# Patient Record
Sex: MALE | Race: WHITE | NOT HISPANIC OR LATINO | Employment: OTHER | URBAN - METROPOLITAN AREA
[De-identification: names, ages, dates, MRNs, and addresses within clinical notes are randomized per-mention and may not be internally consistent; named-entity substitution may affect disease eponyms.]

---

## 2018-05-22 ENCOUNTER — HISTORICAL (OUTPATIENT)
Dept: RADIOLOGY | Facility: HOSPITAL | Age: 58
End: 2018-05-22

## 2018-06-29 ENCOUNTER — HISTORICAL (OUTPATIENT)
Dept: ADMINISTRATIVE | Facility: HOSPITAL | Age: 58
End: 2018-06-29

## 2018-06-29 LAB
BUN SERPL-MCNC: 12 MG/DL (ref 7–18)
CALCIUM SERPL-MCNC: 9.4 MG/DL (ref 8.5–10.1)
CHLORIDE SERPL-SCNC: 95 MMOL/L (ref 98–107)
CO2 SERPL-SCNC: 32 MMOL/L (ref 21–32)
CREAT SERPL-MCNC: 0.9 MG/DL (ref 0.6–1.3)
CREAT/UREA NIT SERPL: 13
ERYTHROCYTE [DISTWIDTH] IN BLOOD BY AUTOMATED COUNT: 12 % (ref 11.5–14.5)
GLUCOSE SERPL-MCNC: 95 MG/DL (ref 74–106)
HCT VFR BLD AUTO: 44.2 % (ref 40–51)
HGB BLD-MCNC: 15.2 GM/DL (ref 13.5–17.5)
MCH RBC QN AUTO: 32.4 PG (ref 26–34)
MCHC RBC AUTO-ENTMCNC: 34.4 GM/DL (ref 31–37)
MCV RBC AUTO: 94.2 FL (ref 80–100)
PLATELET # BLD AUTO: 201 X10(3)/MCL (ref 130–400)
PMV BLD AUTO: 11.4 FL (ref 7.4–10.4)
POTASSIUM SERPL-SCNC: 3.9 MMOL/L (ref 3.5–5.1)
RBC # BLD AUTO: 4.69 X10(6)/MCL (ref 4.5–5.9)
SODIUM SERPL-SCNC: 133 MMOL/L (ref 136–145)
WBC # SPEC AUTO: 6.3 X10(3)/MCL (ref 4.5–11)

## 2018-07-06 ENCOUNTER — HISTORICAL (OUTPATIENT)
Dept: SURGERY | Facility: HOSPITAL | Age: 58
End: 2018-07-06

## 2018-07-06 LAB
AMPHET UR QL SCN: NEGATIVE
BARBITURATE SCN PRESENT UR: NEGATIVE
BENZODIAZ UR QL SCN: POSITIVE
CANNABINOIDS UR QL SCN: POSITIVE
COCAINE UR QL SCN: NEGATIVE
OPIATES UR QL SCN: NEGATIVE
PCP UR QL: NEGATIVE
PH UR STRIP.AUTO: 7 [PH] (ref 5–8)
POTASSIUM SERPL-SCNC: 3.5 MMOL/L (ref 3.5–5.1)
TEMPERATURE, URINE (OHS): 25 DEGC (ref 20–25)

## 2018-08-17 ENCOUNTER — HISTORICAL (OUTPATIENT)
Dept: ADMINISTRATIVE | Facility: HOSPITAL | Age: 58
End: 2018-08-17

## 2018-08-17 LAB
BUN SERPL-MCNC: 8 MG/DL (ref 7–18)
CALCIUM SERPL-MCNC: 9.1 MG/DL (ref 8.5–10.1)
CHLORIDE SERPL-SCNC: 98 MMOL/L (ref 98–107)
CO2 SERPL-SCNC: 32 MMOL/L (ref 21–32)
CREAT SERPL-MCNC: 0.8 MG/DL (ref 0.6–1.3)
CREAT/UREA NIT SERPL: 10
ERYTHROCYTE [DISTWIDTH] IN BLOOD BY AUTOMATED COUNT: 11.9 % (ref 11.5–14.5)
GLUCOSE SERPL-MCNC: 83 MG/DL (ref 74–106)
HCT VFR BLD AUTO: 45.5 % (ref 40–51)
HGB BLD-MCNC: 15.5 GM/DL (ref 13.5–17.5)
MCH RBC QN AUTO: 32.3 PG (ref 26–34)
MCHC RBC AUTO-ENTMCNC: 34.1 GM/DL (ref 31–37)
MCV RBC AUTO: 94.8 FL (ref 80–100)
PLATELET # BLD AUTO: 185 X10(3)/MCL (ref 130–400)
PMV BLD AUTO: 11.7 FL (ref 7.4–10.4)
POTASSIUM SERPL-SCNC: 4.2 MMOL/L (ref 3.5–5.1)
RBC # BLD AUTO: 4.8 X10(6)/MCL (ref 4.5–5.9)
SODIUM SERPL-SCNC: 136 MMOL/L (ref 136–145)
WBC # SPEC AUTO: 7 X10(3)/MCL (ref 4.5–11)

## 2018-08-24 ENCOUNTER — HISTORICAL (OUTPATIENT)
Dept: SURGERY | Facility: HOSPITAL | Age: 58
End: 2018-08-24

## 2018-08-24 LAB
AMPHET UR QL SCN: NEGATIVE
BARBITURATE SCN PRESENT UR: NEGATIVE
BENZODIAZ UR QL SCN: POSITIVE
CANNABINOIDS UR QL SCN: POSITIVE
COCAINE UR QL SCN: NEGATIVE
OPIATES UR QL SCN: NEGATIVE
PCP UR QL: NEGATIVE
PH UR STRIP.AUTO: 5.5 [PH] (ref 5–8)
POTASSIUM SERPL-SCNC: 3.7 MMOL/L (ref 3.5–5.1)
TEMPERATURE, URINE (OHS): 25 DEGC (ref 20–25)

## 2020-09-14 ENCOUNTER — HISTORICAL (OUTPATIENT)
Dept: LAB | Facility: HOSPITAL | Age: 60
End: 2020-09-14

## 2022-04-10 ENCOUNTER — HISTORICAL (OUTPATIENT)
Dept: ADMINISTRATIVE | Facility: HOSPITAL | Age: 62
End: 2022-04-10

## 2022-04-26 VITALS
DIASTOLIC BLOOD PRESSURE: 64 MMHG | SYSTOLIC BLOOD PRESSURE: 110 MMHG | WEIGHT: 139.56 LBS | HEIGHT: 68 IN | OXYGEN SATURATION: 97 % | BODY MASS INDEX: 21.15 KG/M2

## 2022-05-03 NOTE — HISTORICAL [facility] CERNER
This is a historical note converted from Efrain. Formatting and pictures may have been removed.  Please reference Efrain for original formatting and attached multimedia. Interval H&P  Patient examined and chart reviewed.? No changes to the H&P below.? Ok to proceed with surgery.  ?   Renea Alaniz  General Surgery, PGY-1  ?  ?   Chief Complaint  hernia  History of Present Illness  57-year-old male?ready for hernia that is slowly enlarging.? He states that it will swell to the size of a tennis ball but he is also improving. ?He does say that at times interferes with his daily activities. ?He is currently between jobs.? He didnt  ?   He denies any obstructive symptoms. ?No nausea, vomiting. ?No change in bowel.  ?  Patient states he had a colonoscopy approximately 3 years ago?which was normal.  Review of Systems  Negative as in HPI  Physical Exam  ???Vitals & Measurements  ??T:?36.5? ?C (Oral)? HR:?65(Apical)? RR:?18? BP:?135/91?  ??HT:?172.72?cm? HT:?172.72?cm? WT:?64.8?kg? WT:?64.8?kg? BMI:?21.72?  Gen.: Well-developed, well-nourished, no distress  Respiratory: Unlabored  Heart: Regular rhythm and abdomen: Soft  Groin:?Large right groin inguinal hernia that is easily reducible. ?Overlying skin changes. ?Small left inguinal hernia.  Assessment/Plan  ?  57-year-old male?with large right inguinal hernia and small left femoral hernia.  ?  -To operating room for?laparoscopic?right burst bilateral internal hernia repair.  -Consult obtained in clinic  ?  Adama Landers M.D.,?PGY?3. Problem List/Past Medical History  ??Ongoing  ??Anxiety  ??Hypertension  ??Inguinal hernia  ??Morbid obesity  Procedure/Surgical History  right ear drum repair (1996).  Medications  ??Diovan  mg-25 mg oral tablet, 1 tab(s), Oral, Daily, 11 refills  ??Ninjacof oral liquid, 5 mL, Oral, q8hr, PRN,? ?Not Taking per Prescriber  ??Xanax 1 mg oral tablet, 1 mg= 1 tab(s), Oral, BID, 5 refills  Allergies  No Known Allergies  Social  History  ??Alcohol - High Risk, 10/12/2014  ???Current, Beer, 3-5 times per week, 11/15/2016  ???Current, Beer, Daily, 10/12/2014  ??Substance Abuse  ???Past, 07/03/2015  History of?cocaine use  Family History  ??Alcoholism.: Mother and Father.  ??Heart disease: Mother.  ??Lung cancer: Father.  Health Maintenance  Health Maintenance  ???Pending?(in the next year)  ??? ??OverDue  ??? ? ? ?Hypertension Maintenance-Medication Prescribed due??01/17/14??and every 1??year(s)  ??? ? ? ?Lipid Screening due??01/17/14??and every 1??year(s)  ??? ? ? ?Hypertension Management-BMP due??12/19/16??and every 1??year(s)  ??? ??Due?  ??? ? ? ?Alcohol Misuse Screening due??06/19/18??and every 1??year(s)  ??? ? ? ?Aspirin Therapy for CVD Prevention due??06/19/18??and every 1??year(s)  ??? ? ? ?Colorectal Screening due??06/19/18??Variable frequency  ??? ? ? ?Hypertension Management-Education due??06/19/18??and every 1??year(s)  ??? ? ? ?Tetanus Vaccine due??06/19/18??and every 10??year(s)  ??? ??Due In Future?  ??? ? ? ?Influenza Vaccine not due until??10/02/18??and every 1??year(s)  ??? ? ? ?Hypertension Management-Blood Pressure not due until??12/19/18??and every 6??month(s)  ??? ? ? ?Diabetes Screening not due until??12/19/18??and every 3??year(s)  ???Satisfied?(in the past 1 year)  ??? ??Satisfied?  ??? ? ? ?Blood Pressure Screening on??06/19/18.??Satisfied by Radha Guillen LPN  ??? ? ? ?Body Mass Index Check on??06/19/18.??Satisfied by Mindy JANNIEN, Radha P. P.  ??? ? ? ?Depression Screening on??06/19/18.??Satisfied by Mindy VALDERRAMAN, Radha P. P.  ??? ? ? ?Hypertension Management-Blood Pressure on??06/19/18.??Satisfied by Mindy JANNIEN, Radha P. P.  ??? ? ? ?Obesity Screening on??06/19/18.??Satisfied by Mindy JANNIEN, Radha P. P.  ??? ? ? ?Smoking Cessation on??06/19/18.??Satisfied by Mindy JANNIEN, Radha P. P.    I was present with the resident during the?examination upon rounds.  ???  [ x?] I discussed the case with the  resident and agree with the findings and plan as documented in the residents note.  [ ] I discussed the case with the resident and agree with the findings and plan as documented in the residents note except:

## 2022-05-03 NOTE — HISTORICAL [facility] C[facility]
This is a historical note converted from Efrain. Formatting and pictures may have been removed.  Please reference Efrain for original formatting and attached multimedia. The chart was reviewed and the patient was seen. Agree with assessment below. To OR today for Open Right Inguinal Hernia.  ?   Mihai James MD  Family Medicine, PGY1  ?  Visit Reason  Right?Inguinal Hernia?  ?   History of Present Illness??  Patient?is a?58?year-old?Male?who presents for follow-up of Laparoscopic repair of right femoral hernia and left inguinal hernia on 7/6/18  Seen early postop with recurring bulge in R groin, seroma vs. recurrence  Says its a little larger  Still not?having any obstructive symptoms  ?   Allergies  No Known Allergies  ???  Home Meds  acetaminophen-HYDROcodone: 1 tab(s),Oral,q6hr,PRN (as needed for pain)  alPRAzolam: See Instructions,TAKE 1 TABLET BY MOUTH 2 TIMES A DAY  azithromycin  cetirizine: 10 mg,1 tab(s),Oral,Daily  hydrochlorothiazide-olmesartan: 1 tab(s),Oral,Daily  hydrochlorothiazide-valsartan: See Instructions,TAKE 1 TABLET BY MOUTH DAILY  polymyxin B-trimethoprim ophthalmic  ?   Past Medical History  Anxiety  Hypertension  Inguinal hernia  Tobacco user  ??  Past Surgical History  right ear drum repair: 1996  lap bilateral inguinal hernia repair  ?  Family History  Father: Alcoholism.; Lung cancer  Mother: Alcoholism.; Heart disease  ??  Social History  ?Alcohol  Risk Assessment:?High Risk;?Details:?Current, Beer, 3-5 times per week  Substance Abuse  Details:?Current, Marijuana  Tobacco  Risk Assessment:?High Risk;?Details:?Current every day smoker Use:. ?Cigarettes Type:. ?4 per day.  ??  Review of Systems  Constitutional- no nausea, vomiting, fevers, or?chills  HEENT- no hearing or vision changes  CV- no chest pain, no palpitations  Pulm- no SOB or history of lung disease  GI- no abdominal pain, nausea, vomiting  - no dysuria or hematuria  MSK- no back pain, no muscle pain  Immuno- no food  allergies  Neuro- no motor or sensory losses  Psych- no history of substance abuse  ?  Physical Exam  Temperature???????????????????? ? 36.7 ???(12:58)  Systolic Blood Pressure?????? ??126 ???(12:58)  Diastolic Blood Pressure????????89 ???(12:58)  Pulse????????????????????????????????? 77 ???(12:58)  SpO2????????????????????????????????? No result  Respiratory Rate?????????????? ???20 ???(12:58)  ?  Gen- A&O, NAD  HEENT- Normocephalic, EOMI, no JVD  Pulm- CTAB, respirations are non-labored, symmetric chest wall expansion  CV- RRR, good pulses B, normal peripheral perfusion??  GI- Soft, NT, ND, normal BS, port sites healing well, L groin normal, R groin with firm bulge along inguinal canal- reducible on exam today  MSK- normal range of motion, no swelling, no deformity??  Skin- warm, dry, intact, no rash  Neuro- no focal deficits  Psych- cooperative, appropriate mood & affect  ?  Assessment/Plan  Recurrent?right inguinal vs. femoral hernia  ?  Risks, benefits, and alternatives?of?open right?inguinal hernia?repair?were?discussed with patient.  Will plan OR for 8/24/18   Above reviewed and pt seen.  He is prepared for repair of the recurrence.

## 2022-07-14 PROBLEM — I10 HYPERTENSION: Status: ACTIVE | Noted: 2022-07-14

## 2022-07-14 PROBLEM — J30.9 ALLERGIC RHINITIS: Status: ACTIVE | Noted: 2022-07-14

## 2022-07-14 PROBLEM — Z72.0 TOBACCO USER: Status: ACTIVE | Noted: 2022-07-14

## 2022-07-14 PROBLEM — G47.00 INSOMNIA: Status: ACTIVE | Noted: 2022-07-14

## 2023-09-05 PROBLEM — R56.9 SEIZURE: Status: ACTIVE | Noted: 2023-08-17

## 2023-09-05 PROBLEM — E87.1 HYPONATREMIA: Status: ACTIVE | Noted: 2023-08-17

## 2023-09-05 PROBLEM — F10.10 ALCOHOL ABUSE: Status: ACTIVE | Noted: 2023-08-17

## 2023-09-27 PROBLEM — N18.1 CHRONIC KIDNEY DISEASE, STAGE I: Status: ACTIVE | Noted: 2023-09-07

## 2023-09-27 PROBLEM — I12.9 MALIGNANT HYPERTENSIVE KIDNEY DISEASE WITH CHRONIC KIDNEY DISEASE STAGE I THROUGH STAGE IV, OR UNSPECIFIED: Status: ACTIVE | Noted: 2023-09-07

## 2023-09-28 ENCOUNTER — LAB VISIT (OUTPATIENT)
Dept: LAB | Facility: HOSPITAL | Age: 63
End: 2023-09-28
Attending: FAMILY MEDICINE
Payer: MEDICAID

## 2023-09-28 DIAGNOSIS — Z00.00 WELLNESS EXAMINATION: ICD-10-CM

## 2023-09-28 LAB
ALBUMIN SERPL-MCNC: 4.2 G/DL (ref 3.4–4.8)
ALBUMIN/GLOB SERPL: 1.7 RATIO (ref 1.1–2)
ALP SERPL-CCNC: 96 UNIT/L (ref 40–150)
ALT SERPL-CCNC: 15 UNIT/L (ref 0–55)
APPEARANCE UR: CLEAR
AST SERPL-CCNC: 15 UNIT/L (ref 5–34)
BACTERIA #/AREA URNS AUTO: NORMAL /HPF
BASOPHILS # BLD AUTO: 0.05 X10(3)/MCL
BASOPHILS NFR BLD AUTO: 0.9 %
BILIRUB SERPL-MCNC: 0.3 MG/DL
BILIRUB UR QL STRIP.AUTO: NEGATIVE
BUN SERPL-MCNC: 8.3 MG/DL (ref 8.4–25.7)
CALCIUM SERPL-MCNC: 9.7 MG/DL (ref 8.8–10)
CHLORIDE SERPL-SCNC: 92 MMOL/L (ref 98–107)
CHOLEST SERPL-MCNC: 156 MG/DL
CHOLEST/HDLC SERPL: 2 {RATIO} (ref 0–5)
CO2 SERPL-SCNC: 28 MMOL/L (ref 23–31)
COLOR UR AUTO: YELLOW
CREAT SERPL-MCNC: 0.84 MG/DL (ref 0.73–1.18)
DEPRECATED CALCIDIOL+CALCIFEROL SERPL-MC: 66.6 NG/ML (ref 30–80)
EOSINOPHIL # BLD AUTO: 0.14 X10(3)/MCL (ref 0–0.9)
EOSINOPHIL NFR BLD AUTO: 2.4 %
ERYTHROCYTE [DISTWIDTH] IN BLOOD BY AUTOMATED COUNT: 13.1 % (ref 11.5–17)
EST. AVERAGE GLUCOSE BLD GHB EST-MCNC: 96.8 MG/DL
GFR SERPLBLD CREATININE-BSD FMLA CKD-EPI: >60 MLS/MIN/1.73/M2
GLOBULIN SER-MCNC: 2.5 GM/DL (ref 2.4–3.5)
GLUCOSE SERPL-MCNC: 83 MG/DL (ref 82–115)
GLUCOSE UR QL STRIP.AUTO: NEGATIVE
HBA1C MFR BLD: 5 %
HCT VFR BLD AUTO: 43.2 % (ref 42–52)
HDLC SERPL-MCNC: 67 MG/DL (ref 35–60)
HGB BLD-MCNC: 14 G/DL (ref 14–18)
IMM GRANULOCYTES # BLD AUTO: 0.01 X10(3)/MCL (ref 0–0.04)
IMM GRANULOCYTES NFR BLD AUTO: 0.2 %
KETONES UR QL STRIP.AUTO: NEGATIVE
LDLC SERPL CALC-MCNC: 76 MG/DL (ref 50–140)
LEUKOCYTE ESTERASE UR QL STRIP.AUTO: NEGATIVE
LYMPHOCYTES # BLD AUTO: 1.1 X10(3)/MCL (ref 0.6–4.6)
LYMPHOCYTES NFR BLD AUTO: 18.9 %
MCH RBC QN AUTO: 30 PG (ref 27–31)
MCHC RBC AUTO-ENTMCNC: 32.4 G/DL (ref 33–36)
MCV RBC AUTO: 92.7 FL (ref 80–94)
MONOCYTES # BLD AUTO: 0.49 X10(3)/MCL (ref 0.1–1.3)
MONOCYTES NFR BLD AUTO: 8.4 %
NEUTROPHILS # BLD AUTO: 4.03 X10(3)/MCL (ref 2.1–9.2)
NEUTROPHILS NFR BLD AUTO: 69.2 %
NITRITE UR QL STRIP.AUTO: NEGATIVE
PH UR STRIP.AUTO: 7 [PH]
PHOSPHATE SERPL-MCNC: 3.2 MG/DL (ref 2.3–4.7)
PLATELET # BLD AUTO: 248 X10(3)/MCL (ref 130–400)
PMV BLD AUTO: 9.7 FL (ref 7.4–10.4)
POTASSIUM SERPL-SCNC: 4.4 MMOL/L (ref 3.5–5.1)
PROT SERPL-MCNC: 6.7 GM/DL (ref 5.8–7.6)
PROT UR QL STRIP.AUTO: NEGATIVE
PSA SERPL-MCNC: 2.11 NG/ML
RBC # BLD AUTO: 4.66 X10(6)/MCL (ref 4.7–6.1)
RBC #/AREA URNS AUTO: NORMAL /HPF
RBC UR QL AUTO: ABNORMAL
SODIUM SERPL-SCNC: 128 MMOL/L (ref 136–145)
SP GR UR STRIP.AUTO: 1.02 (ref 1–1.03)
SQUAMOUS #/AREA URNS AUTO: NORMAL /HPF
T3FREE SERPL-MCNC: 2.38 PG/ML (ref 1.58–3.91)
T4 FREE SERPL-MCNC: 0.91 NG/DL (ref 0.7–1.48)
TRIGL SERPL-MCNC: 67 MG/DL (ref 34–140)
TSH SERPL-ACNC: 0.4 UIU/ML (ref 0.35–4.94)
UROBILINOGEN UR STRIP-ACNC: 0.2
VIT B12 SERPL-MCNC: 542 PG/ML (ref 213–816)
VLDLC SERPL CALC-MCNC: 13 MG/DL
WBC # SPEC AUTO: 5.82 X10(3)/MCL (ref 4.5–11.5)
WBC #/AREA URNS AUTO: NORMAL /HPF

## 2023-09-28 PROCEDURE — 82607 VITAMIN B-12: CPT

## 2023-09-28 PROCEDURE — 85025 COMPLETE CBC W/AUTO DIFF WBC: CPT

## 2023-09-28 PROCEDURE — 84439 ASSAY OF FREE THYROXINE: CPT

## 2023-09-28 PROCEDURE — 84481 FREE ASSAY (FT-3): CPT

## 2023-09-28 PROCEDURE — 84100 ASSAY OF PHOSPHORUS: CPT

## 2023-09-28 PROCEDURE — 83036 HEMOGLOBIN GLYCOSYLATED A1C: CPT

## 2023-09-28 PROCEDURE — 80061 LIPID PANEL: CPT

## 2023-09-28 PROCEDURE — 82306 VITAMIN D 25 HYDROXY: CPT

## 2023-09-28 PROCEDURE — 84153 ASSAY OF PSA TOTAL: CPT

## 2023-09-28 PROCEDURE — 84443 ASSAY THYROID STIM HORMONE: CPT

## 2023-09-28 PROCEDURE — 80053 COMPREHEN METABOLIC PANEL: CPT

## 2023-09-28 PROCEDURE — 36415 COLL VENOUS BLD VENIPUNCTURE: CPT

## 2023-09-28 PROCEDURE — 81001 URINALYSIS AUTO W/SCOPE: CPT

## 2023-12-11 ENCOUNTER — HOSPITAL ENCOUNTER (EMERGENCY)
Facility: HOSPITAL | Age: 63
Discharge: HOME | End: 2023-12-11
Attending: FAMILY MEDICINE
Payer: MEDICAID

## 2023-12-11 VITALS
SYSTOLIC BLOOD PRESSURE: 174 MMHG | HEART RATE: 79 BPM | DIASTOLIC BLOOD PRESSURE: 97 MMHG | RESPIRATION RATE: 20 BRPM | TEMPERATURE: 98 F | WEIGHT: 140 LBS | BODY MASS INDEX: 21.47 KG/M2 | OXYGEN SATURATION: 100 %

## 2023-12-11 DIAGNOSIS — F19.10 POLYSUBSTANCE ABUSE: ICD-10-CM

## 2023-12-11 DIAGNOSIS — R03.0 ELEVATED BLOOD PRESSURE READING: ICD-10-CM

## 2023-12-11 DIAGNOSIS — R55 SYNCOPE, UNSPECIFIED SYNCOPE TYPE: Primary | ICD-10-CM

## 2023-12-11 DIAGNOSIS — R55 SYNCOPE: ICD-10-CM

## 2023-12-11 DIAGNOSIS — E87.1 CHRONIC HYPONATREMIA: ICD-10-CM

## 2023-12-11 LAB
AMPHET UR QL SCN: POSITIVE
ANION GAP SERPL CALC-SCNC: 12 MEQ/L
BARBITURATE SCN PRESENT UR: NEGATIVE
BASOPHILS # BLD AUTO: 0.02 X10(3)/MCL
BASOPHILS NFR BLD AUTO: 0.4 %
BENZODIAZ UR QL SCN: POSITIVE
BUN SERPL-MCNC: 5.1 MG/DL (ref 8.4–25.7)
CALCIUM SERPL-MCNC: 9.1 MG/DL (ref 8.8–10)
CANNABINOIDS UR QL SCN: POSITIVE
CHLORIDE SERPL-SCNC: 91 MMOL/L (ref 98–107)
CO2 SERPL-SCNC: 25 MMOL/L (ref 23–31)
COCAINE UR QL SCN: POSITIVE
CREAT SERPL-MCNC: 0.76 MG/DL (ref 0.73–1.18)
CREAT/UREA NIT SERPL: 7
EOSINOPHIL # BLD AUTO: 0.24 X10(3)/MCL (ref 0–0.9)
EOSINOPHIL NFR BLD AUTO: 4.9 %
ERYTHROCYTE [DISTWIDTH] IN BLOOD BY AUTOMATED COUNT: 12.4 % (ref 11.5–17)
GFR SERPLBLD CREATININE-BSD FMLA CKD-EPI: >60 MLS/MIN/1.73/M2
GLUCOSE SERPL-MCNC: 108 MG/DL (ref 82–115)
HCT VFR BLD AUTO: 39.8 % (ref 42–52)
HGB BLD-MCNC: 13.1 G/DL (ref 14–18)
IMM GRANULOCYTES # BLD AUTO: 0.01 X10(3)/MCL (ref 0–0.04)
IMM GRANULOCYTES NFR BLD AUTO: 0.2 %
LYMPHOCYTES # BLD AUTO: 0.93 X10(3)/MCL (ref 0.6–4.6)
LYMPHOCYTES NFR BLD AUTO: 19.1 %
MAGNESIUM SERPL-MCNC: 1.8 MG/DL (ref 1.6–2.6)
MCH RBC QN AUTO: 30.2 PG (ref 27–31)
MCHC RBC AUTO-ENTMCNC: 32.9 G/DL (ref 33–36)
MCV RBC AUTO: 91.7 FL (ref 80–94)
MONOCYTES # BLD AUTO: 0.41 X10(3)/MCL (ref 0.1–1.3)
MONOCYTES NFR BLD AUTO: 8.4 %
NEUTROPHILS # BLD AUTO: 3.26 X10(3)/MCL (ref 2.1–9.2)
NEUTROPHILS NFR BLD AUTO: 67 %
OPIATES UR QL SCN: NEGATIVE
PCP UR QL: NEGATIVE
PH UR: 7 [PH] (ref 3–11)
PLATELET # BLD AUTO: 192 X10(3)/MCL (ref 130–400)
PMV BLD AUTO: 10.3 FL (ref 7.4–10.4)
POC CARDIAC TROPONIN I: 0 NG/ML (ref 0–0.08)
POTASSIUM SERPL-SCNC: 3.5 MMOL/L (ref 3.5–5.1)
RBC # BLD AUTO: 4.34 X10(6)/MCL (ref 4.7–6.1)
SAMPLE: NORMAL
SODIUM SERPL-SCNC: 128 MMOL/L (ref 136–145)
SPECIFIC GRAVITY, URINE AUTO (.000) (OHS): 1.02 (ref 1–1.03)
WBC # SPEC AUTO: 4.87 X10(3)/MCL (ref 4.5–11.5)

## 2023-12-11 PROCEDURE — 25000003 PHARM REV CODE 250: Performed by: FAMILY MEDICINE

## 2023-12-11 PROCEDURE — 84484 ASSAY OF TROPONIN QUANT: CPT

## 2023-12-11 PROCEDURE — 80307 DRUG TEST PRSMV CHEM ANLYZR: CPT | Performed by: FAMILY MEDICINE

## 2023-12-11 PROCEDURE — 80048 BASIC METABOLIC PNL TOTAL CA: CPT | Performed by: FAMILY MEDICINE

## 2023-12-11 PROCEDURE — 83735 ASSAY OF MAGNESIUM: CPT | Performed by: FAMILY MEDICINE

## 2023-12-11 PROCEDURE — 93005 ELECTROCARDIOGRAM TRACING: CPT

## 2023-12-11 PROCEDURE — 96360 HYDRATION IV INFUSION INIT: CPT

## 2023-12-11 PROCEDURE — 99285 EMERGENCY DEPT VISIT HI MDM: CPT | Mod: 25

## 2023-12-11 PROCEDURE — 85025 COMPLETE CBC W/AUTO DIFF WBC: CPT | Performed by: FAMILY MEDICINE

## 2023-12-11 RX ORDER — CLONIDINE HYDROCHLORIDE 0.1 MG/1
0.1 TABLET ORAL
Status: COMPLETED | OUTPATIENT
Start: 2023-12-11 | End: 2023-12-11

## 2023-12-11 RX ADMIN — CLONIDINE HYDROCHLORIDE 0.1 MG: 0.1 TABLET ORAL at 05:12

## 2023-12-11 RX ADMIN — SODIUM CHLORIDE 1000 ML: 9 INJECTION, SOLUTION INTRAVENOUS at 05:12

## 2023-12-11 NOTE — ED PROVIDER NOTES
Encounter Date: 12/11/2023       History     Chief Complaint   Patient presents with    Loss of Consciousness     Pt was talking with a friend at his house when he passed out with friend helping him to the ground/ the friend told yanira he had seizure looking activity while falling/ he states he had seizures in the past but never on seizure meds/ he is out of his xanax for 10 days now      63-year-old was brought in says it is a friend's house and he passed out patient denies any symptoms right now no chest pain no shortness of breath        Review of patient's allergies indicates:  No Known Allergies  Past Medical History:   Diagnosis Date    Seizures      Past Surgical History:   Procedure Laterality Date    SPINE SURGERY       No family history on file.  Social History     Tobacco Use    Smoking status: Every Day     Current packs/day: 1.00     Types: Cigarettes    Smokeless tobacco: Never   Substance Use Topics    Alcohol use: Yes     Comment: Socially     Review of Systems   Constitutional:  Negative for fever.   HENT:  Negative for sore throat.    Respiratory:  Negative for shortness of breath.    Cardiovascular:  Negative for chest pain.   Gastrointestinal:  Negative for nausea.   Genitourinary:  Negative for dysuria.   Musculoskeletal:  Negative for back pain.   Skin:  Negative for rash.   Neurological:  Positive for syncope. Negative for weakness. Facial asymmetry: usea vomiting fevers chills.  Hematological:  Does not bruise/bleed easily.   All other systems reviewed and are negative.      Physical Exam     Initial Vitals [12/11/23 1636]   BP Pulse Resp Temp SpO2   (!) 158/114 82 (!) 24 97.6 °F (36.4 °C) 100 %      MAP       --         Physical Exam    Nursing note and vitals reviewed.  Constitutional: He appears well-developed and well-nourished. He is active.   HENT:   Head: Normocephalic and atraumatic.   Eyes: Conjunctivae, EOM and lids are normal. Pupils are equal, round, and reactive to light.   Neck:  Trachea normal and phonation normal. Neck supple. No thyroid mass present.   Normal range of motion.  Cardiovascular:  Normal rate, regular rhythm, normal heart sounds and normal pulses.           Pulmonary/Chest: Breath sounds normal.   Abdominal: Abdomen is soft. Bowel sounds are normal.   Musculoskeletal:         General: Normal range of motion.      Cervical back: Normal range of motion and neck supple.     Neurological: He is alert and oriented to person, place, and time. He has normal strength and normal reflexes. GCS score is 15. GCS eye subscore is 4. GCS verbal subscore is 5. GCS motor subscore is 6.   Skin: Skin is warm and intact.   Psychiatric: He has a normal mood and affect. His speech is normal and behavior is normal. Judgment and thought content normal. Cognition and memory are normal.         ED Course   Procedures  Labs Reviewed   BASIC METABOLIC PANEL - Abnormal; Notable for the following components:       Result Value    Sodium Level 128 (*)     Chloride 91 (*)     Blood Urea Nitrogen 5.1 (*)     All other components within normal limits   DRUG SCREEN, URINE (BEAKER) - Abnormal; Notable for the following components:    Amphetamines, Urine Positive (*)     Benzodiazepine, Urine Positive (*)     Cannabinoids, Urine Positive (*)     Cocaine, Urine Positive (*)     All other components within normal limits    Narrative:     Cut off concentrations:    Amphetamines - 1000 ng/ml  Barbiturates - 200 ng/ml  Benzodiazepine - 200 ng/ml  Cannabinoids (THC) - 50 ng/ml  Cocaine - 300 ng/ml  Fentanyl - 1.0 ng/ml  MDMA - 500 ng/ml  Opiates - 300 ng/ml   Phencyclidine (PCP) - 25 ng/ml    Specimen submitted for drug analysis and tested for pH and specific gravity in order to evaluate sample integrity. Suspect tampering if specific gravity is <1.003 and/or pH is not within the range of 4.5 - 8.0  False negatives may result form substances such as bleach added to urine.  False positives may result for the presence  of a substance with similar chemical structure to the drug or its metabolite.    This test provides only a PRELIMINARY analytical test result. A more specific alternate chemical method must be used in order to obtain a confirmed analytical result. Gas chromatography/mass spectrometry (GC/MS) is the preferred confirmatory method. Other chemical confirmation methods are available. Clinical consideration and professional judgement should be applied to any drug of abuse test result, particularly when preliminary positive results are used.    Positive results will be confirmed only at the physicians request. Unconfirmed screening results are to be used only for medical purposes (treatment).        CBC WITH DIFFERENTIAL - Abnormal; Notable for the following components:    RBC 4.34 (*)     Hgb 13.1 (*)     Hct 39.8 (*)     MCHC 32.9 (*)     All other components within normal limits   MAGNESIUM - Normal   CBC W/ AUTO DIFFERENTIAL    Narrative:     The following orders were created for panel order CBC Auto Differential.  Procedure                               Abnormality         Status                     ---------                               -----------         ------                     CBC with Differential[[id number]]       Abnormal            Final result                 Please view results for these tests on the individual orders.   TROPONIN ISTAT     EKG Readings: (Independently Interpreted)   Initial Reading: No STEMI. Rhythm: Normal Sinus Rhythm. Heart Rate: 69. Ectopy: No Ectopy. Conduction: Normal. ST Segments: Normal ST Segments. T Waves: Normal. Clinical Impression: Normal Sinus Rhythm       Imaging Results              X-Ray Chest 1 View (Final result)  Result time 12/11/23 17:46:17      Final result by Desiree Qiu MD (12/11/23 17:46:17)                   Impression:      No acute abnormality of the chest.      Electronically signed by: Desiree Qiu  Date:    12/11/2023  Time:    17:46                Narrative:    EXAMINATION:  XR CHEST 1 VIEW    CLINICAL HISTORY:  Syncope and collapse    TECHNIQUE:  AP chest    COMPARISON:  Chest x-ray dated 06/29/2018    FINDINGS:  The heart is normal in size.  The lungs are clear.  There is no pleural effusion or visible pneumothorax.                                       Medications   sodium chloride 0.9% bolus 1,000 mL 1,000 mL (0 mLs Intravenous Stopped 12/11/23 1753)   cloNIDine tablet 0.1 mg (0.1 mg Oral Given 12/11/23 1737)     Medical Decision Making  I, Dr. Quintero, assumed care of this patient at 6:00 p.m. from Dr. Hunt; patient is resting comfortably at this time no complaints    DIFFERENTIAL DIAGNOSIS- vertigo, dysrhythmia, substance abuse, electrolyte abnormality, anemia    Amount and/or Complexity of Data Reviewed  Labs: ordered. Decision-making details documented in ED Course.  Radiology: ordered. Decision-making details documented in ED Course.  ECG/medicine tests: ordered and independent interpretation performed. Decision-making details documented in ED Course.    Risk  Prescription drug management.  Risk Details: Patient's workup revealed polysubstance abuse; patient admits to drug use and states at this time he feels better in his ready to go home; there is no other indication is cause of his syncope; his blood pressure has improved               ED Course as of 12/12/23 0202   Mon Dec 11, 2023   1731  Patient lab work pending patient turned over to Dr. Quintero 6:00 p.m. [BL]   1753 POC Cardiac Troponin I: 0.00 [BL]   1758 WBC: 4.87 [BL]   1758 Hematocrit(!): 39.8 [BL]   1758 Hemoglobin(!): 13.1 [BL]   1758 Impression:     No acute abnormality of the chest.   [BL]   1837 Cocaine (Metab.)(!): Positive [DD]   1837 Cannabinoids, Urine(!): Positive [DD]   1837 Benzodiazepine Screen, Urine(!): Positive [DD]   1837 Amphetamine Screen, Ur(!): Positive [DD]      ED Course User Index  [BL] Brandon Hunt MD  [DD] Otis Quintero MD        Patient Vitals for  the past 24 hrs:   BP Temp Temp src Pulse Resp SpO2 Weight   12/11/23 1855 (!) 174/97 -- -- 79 20 100 % --   12/11/23 1737 (!) 182/110 -- -- -- -- -- --   12/11/23 1636 (!) 158/114 97.6 °F (36.4 °C) Oral 82 (!) 24 100 % 63.5 kg (140 lb)       The patient is resting comfortably and in no acute distress.   He states that his symptoms have improved after treatment in Emergency Department. I personally discussed his test results and treatment plan.  Gave strict ED precautions.  Specific conditions for return to the emergency department and importance of follow up with his primary care provided or the physician listed on the discharge instructions.  Patient voices understanding and agrees to the plan discussed. All patients' questions have been answered at this time.   He has remained hemodynamically stable throughout entire stay in ED and is stable for discharge home.                 Clinical Impression:  Final diagnoses:  [R55] Syncope  [R55] Syncope, unspecified syncope type (Primary)  [F19.10] Polysubstance abuse  [E87.1] Chronic hyponatremia  [R03.0] Elevated blood pressure reading          ED Disposition Condition    Discharge Stable          ED Prescriptions    None       Follow-up Information       Follow up With Specialties Details Why Contact Info    Raquel Duke MD Family Medicine Schedule an appointment as soon as possible for a visit   [address] [place] [id number]  [phone]               Otis Quintero MD  12/12/23 0724

## 2024-01-09 PROBLEM — I12.9: Status: RESOLVED | Noted: 2023-09-07 | Resolved: 2024-01-09

## 2024-04-14 ENCOUNTER — HOSPITAL ENCOUNTER (OUTPATIENT)
Facility: HOSPITAL | Age: 64
Discharge: HOME | End: 2024-04-16
Attending: SPECIALIST | Admitting: STUDENT IN AN ORGANIZED HEALTH CARE EDUCATION/TRAINING PROGRAM
Payer: MEDICAID

## 2024-04-14 DIAGNOSIS — R07.9 CHEST PAIN: ICD-10-CM

## 2024-04-14 DIAGNOSIS — F19.10 POLYSUBSTANCE ABUSE: Primary | ICD-10-CM

## 2024-04-14 DIAGNOSIS — E87.1 HYPONATREMIA: ICD-10-CM

## 2024-04-14 LAB
ALBUMIN SERPL-MCNC: 4.3 G/DL (ref 3.4–4.8)
ALBUMIN/GLOB SERPL: 1.4 RATIO (ref 1.1–2)
ALP SERPL-CCNC: 126 UNIT/L (ref 40–150)
ALT SERPL-CCNC: 21 UNIT/L (ref 0–55)
AMPHET UR QL SCN: POSITIVE
APPEARANCE UR: CLEAR
AST SERPL-CCNC: 29 UNIT/L (ref 5–34)
BACTERIA #/AREA URNS AUTO: NORMAL /HPF
BARBITURATE SCN PRESENT UR: NEGATIVE
BASOPHILS # BLD AUTO: 0.02 X10(3)/MCL
BASOPHILS NFR BLD AUTO: 0.2 %
BENZODIAZ UR QL SCN: POSITIVE
BILIRUB SERPL-MCNC: 0.4 MG/DL
BILIRUB UR QL STRIP.AUTO: NEGATIVE
BUN SERPL-MCNC: 7.4 MG/DL (ref 8.4–25.7)
CALCIUM SERPL-MCNC: 9.7 MG/DL (ref 8.8–10)
CANNABINOIDS UR QL SCN: POSITIVE
CHLORIDE SERPL-SCNC: 87 MMOL/L (ref 98–107)
CO2 SERPL-SCNC: 24 MMOL/L (ref 23–31)
COCAINE UR QL SCN: POSITIVE
COLOR UR AUTO: YELLOW
CREAT SERPL-MCNC: 0.87 MG/DL (ref 0.73–1.18)
EOSINOPHIL # BLD AUTO: 0.02 X10(3)/MCL (ref 0–0.9)
EOSINOPHIL NFR BLD AUTO: 0.2 %
ERYTHROCYTE [DISTWIDTH] IN BLOOD BY AUTOMATED COUNT: 11.6 % (ref 11.5–17)
GFR SERPLBLD CREATININE-BSD FMLA CKD-EPI: >60 MLS/MIN/1.73/M2
GLOBULIN SER-MCNC: 3 GM/DL (ref 2.4–3.5)
GLUCOSE SERPL-MCNC: 99 MG/DL (ref 82–115)
GLUCOSE UR QL STRIP.AUTO: NEGATIVE
HCT VFR BLD AUTO: 36.9 % (ref 42–52)
HGB BLD-MCNC: 13 G/DL (ref 14–18)
IMM GRANULOCYTES # BLD AUTO: 0.04 X10(3)/MCL (ref 0–0.04)
IMM GRANULOCYTES NFR BLD AUTO: 0.4 %
KETONES UR QL STRIP.AUTO: 15
LEUKOCYTE ESTERASE UR QL STRIP.AUTO: NEGATIVE
LYMPHOCYTES # BLD AUTO: 1.21 X10(3)/MCL (ref 0.6–4.6)
LYMPHOCYTES NFR BLD AUTO: 12.8 %
MCH RBC QN AUTO: 31.1 PG (ref 27–31)
MCHC RBC AUTO-ENTMCNC: 35.2 G/DL (ref 33–36)
MCV RBC AUTO: 88.3 FL (ref 80–94)
MONOCYTES # BLD AUTO: 0.67 X10(3)/MCL (ref 0.1–1.3)
MONOCYTES NFR BLD AUTO: 7.1 %
NEUTROPHILS # BLD AUTO: 7.47 X10(3)/MCL (ref 2.1–9.2)
NEUTROPHILS NFR BLD AUTO: 79.3 %
NITRITE UR QL STRIP.AUTO: NEGATIVE
OPIATES UR QL SCN: NEGATIVE
PCP UR QL: NEGATIVE
PH UR STRIP.AUTO: 7 [PH]
PH UR: 7 [PH] (ref 3–11)
PLATELET # BLD AUTO: 245 X10(3)/MCL (ref 130–400)
PMV BLD AUTO: 9.2 FL (ref 7.4–10.4)
POTASSIUM SERPL-SCNC: 3.6 MMOL/L (ref 3.5–5.1)
PROT SERPL-MCNC: 7.3 GM/DL (ref 5.8–7.6)
PROT UR QL STRIP.AUTO: NEGATIVE
RBC # BLD AUTO: 4.18 X10(6)/MCL (ref 4.7–6.1)
RBC #/AREA URNS AUTO: NORMAL /HPF
RBC UR QL AUTO: NEGATIVE
SODIUM SERPL-SCNC: 124 MMOL/L (ref 136–145)
SP GR UR STRIP.AUTO: 1.02 (ref 1–1.03)
SPECIFIC GRAVITY, URINE AUTO (.000) (OHS): 1.02 (ref 1–1.03)
SQUAMOUS #/AREA URNS AUTO: NORMAL /HPF
UROBILINOGEN UR STRIP-ACNC: 0.2
WBC # SPEC AUTO: 9.43 X10(3)/MCL (ref 4.5–11.5)
WBC #/AREA URNS AUTO: NORMAL /HPF

## 2024-04-14 PROCEDURE — G0378 HOSPITAL OBSERVATION PER HR: HCPCS

## 2024-04-14 PROCEDURE — 81003 URINALYSIS AUTO W/O SCOPE: CPT | Performed by: SPECIALIST

## 2024-04-14 PROCEDURE — 85025 COMPLETE CBC W/AUTO DIFF WBC: CPT | Performed by: SPECIALIST

## 2024-04-14 PROCEDURE — 99285 EMERGENCY DEPT VISIT HI MDM: CPT | Mod: 25

## 2024-04-14 PROCEDURE — 96360 HYDRATION IV INFUSION INIT: CPT

## 2024-04-14 PROCEDURE — 25000003 PHARM REV CODE 250: Performed by: SPECIALIST

## 2024-04-14 PROCEDURE — 80307 DRUG TEST PRSMV CHEM ANLYZR: CPT | Performed by: SPECIALIST

## 2024-04-14 PROCEDURE — 80053 COMPREHEN METABOLIC PANEL: CPT | Performed by: SPECIALIST

## 2024-04-14 RX ORDER — ONDANSETRON 4 MG/1
4 TABLET, ORALLY DISINTEGRATING ORAL EVERY 6 HOURS PRN
Status: DISCONTINUED | OUTPATIENT
Start: 2024-04-14 | End: 2024-04-15

## 2024-04-14 RX ORDER — SIMETHICONE 80 MG
1 TABLET,CHEWABLE ORAL 4 TIMES DAILY PRN
Status: DISCONTINUED | OUTPATIENT
Start: 2024-04-14 | End: 2024-04-15

## 2024-04-14 RX ORDER — ACETAMINOPHEN 325 MG/1
650 TABLET ORAL EVERY 4 HOURS PRN
Status: DISCONTINUED | OUTPATIENT
Start: 2024-04-14 | End: 2024-04-16 | Stop reason: HOSPADM

## 2024-04-14 RX ORDER — TRAZODONE HYDROCHLORIDE 100 MG/1
100 TABLET ORAL NIGHTLY
Status: ON HOLD | COMMUNITY
End: 2024-04-16

## 2024-04-14 RX ORDER — TALC
9 POWDER (GRAM) TOPICAL NIGHTLY PRN
Status: DISCONTINUED | OUTPATIENT
Start: 2024-04-14 | End: 2024-04-16 | Stop reason: HOSPADM

## 2024-04-14 RX ORDER — NIFEDIPINE 30 MG/1
30 TABLET, FILM COATED, EXTENDED RELEASE ORAL DAILY
Status: ON HOLD | COMMUNITY
End: 2024-04-16

## 2024-04-14 RX ORDER — HYDROCODONE BITARTRATE AND ACETAMINOPHEN 5; 325 MG/1; MG/1
1 TABLET ORAL EVERY 6 HOURS PRN
Status: DISCONTINUED | OUTPATIENT
Start: 2024-04-14 | End: 2024-04-15

## 2024-04-14 RX ORDER — SODIUM CHLORIDE 0.9 % (FLUSH) 0.9 %
2 SYRINGE (ML) INJECTION EVERY 6 HOURS PRN
Status: DISCONTINUED | OUTPATIENT
Start: 2024-04-14 | End: 2024-04-15

## 2024-04-14 RX ORDER — TALC
6 POWDER (GRAM) TOPICAL NIGHTLY PRN
Status: DISCONTINUED | OUTPATIENT
Start: 2024-04-14 | End: 2024-04-14

## 2024-04-14 RX ORDER — HYDROXYZINE PAMOATE 50 MG/1
100 CAPSULE ORAL NIGHTLY
Status: ON HOLD | COMMUNITY
End: 2024-04-16

## 2024-04-14 RX ORDER — GUAIFENESIN 100 MG/5ML
200 SOLUTION ORAL EVERY 4 HOURS PRN
Status: DISCONTINUED | OUTPATIENT
Start: 2024-04-14 | End: 2024-04-16 | Stop reason: HOSPADM

## 2024-04-14 RX ORDER — MORPHINE SULFATE 4 MG/ML
1 INJECTION, SOLUTION INTRAMUSCULAR; INTRAVENOUS EVERY 4 HOURS PRN
Status: DISCONTINUED | OUTPATIENT
Start: 2024-04-14 | End: 2024-04-15

## 2024-04-14 RX ORDER — SODIUM CHLORIDE 9 MG/ML
1000 INJECTION, SOLUTION INTRAVENOUS CONTINUOUS
Status: ACTIVE | OUTPATIENT
Start: 2024-04-14 | End: 2024-04-15

## 2024-04-14 RX ORDER — BENZONATATE 100 MG/1
200 CAPSULE ORAL 3 TIMES DAILY PRN
Status: DISCONTINUED | OUTPATIENT
Start: 2024-04-14 | End: 2024-04-16 | Stop reason: HOSPADM

## 2024-04-14 RX ORDER — FOLIC ACID 1 MG/1
1 TABLET ORAL DAILY
Status: ON HOLD | COMMUNITY
End: 2024-04-16

## 2024-04-14 RX ORDER — PANTOPRAZOLE SODIUM 40 MG/1
40 TABLET, DELAYED RELEASE ORAL DAILY
Status: ON HOLD | COMMUNITY
End: 2024-04-16

## 2024-04-14 RX ORDER — PANTOPRAZOLE SODIUM 40 MG/1
40 TABLET, DELAYED RELEASE ORAL DAILY
Status: COMPLETED | OUTPATIENT
Start: 2024-04-15 | End: 2024-04-16

## 2024-04-14 RX ORDER — OLANZAPINE 5 MG/1
5 TABLET ORAL NIGHTLY
Status: ON HOLD | COMMUNITY
End: 2024-04-16 | Stop reason: HOSPADM

## 2024-04-14 RX ORDER — CALCIUM CARBONATE 200(500)MG
1000 TABLET,CHEWABLE ORAL 3 TIMES DAILY PRN
Status: DISCONTINUED | OUTPATIENT
Start: 2024-04-14 | End: 2024-04-16 | Stop reason: HOSPADM

## 2024-04-14 RX ADMIN — SODIUM CHLORIDE 1000 ML: 9 INJECTION, SOLUTION INTRAVENOUS at 10:04

## 2024-04-14 RX ADMIN — SODIUM CHLORIDE 1000 ML: 9 INJECTION, SOLUTION INTRAVENOUS at 11:04

## 2024-04-14 NOTE — Clinical Note
Diagnosis: Hyponatremia [198519]   Future Attending Provider: REYES, THAIRY G [[id number]]   Special Needs:: No Special Needs [1]

## 2024-04-15 LAB
ANION GAP SERPL CALC-SCNC: 10 MEQ/L
BUN SERPL-MCNC: 5.3 MG/DL (ref 8.4–25.7)
CALCIUM SERPL-MCNC: 9.1 MG/DL (ref 8.8–10)
CHLORIDE SERPL-SCNC: 98 MMOL/L (ref 98–107)
CO2 SERPL-SCNC: 22 MMOL/L (ref 23–31)
CREAT SERPL-MCNC: 0.73 MG/DL (ref 0.73–1.18)
CREAT/UREA NIT SERPL: 7
ETHANOL SERPL-MCNC: <10 MG/DL
GFR SERPLBLD CREATININE-BSD FMLA CKD-EPI: >60 MLS/MIN/1.73/M2
GLUCOSE SERPL-MCNC: 89 MG/DL (ref 82–115)
POTASSIUM SERPL-SCNC: 3.6 MMOL/L (ref 3.5–5.1)
SODIUM SERPL-SCNC: 130 MMOL/L (ref 136–145)

## 2024-04-15 PROCEDURE — 99900035 HC TECH TIME PER 15 MIN (STAT)

## 2024-04-15 PROCEDURE — 80048 BASIC METABOLIC PNL TOTAL CA: CPT | Performed by: SPECIALIST

## 2024-04-15 PROCEDURE — 25000003 PHARM REV CODE 250: Performed by: SPECIALIST

## 2024-04-15 PROCEDURE — 82077 ASSAY SPEC XCP UR&BREATH IA: CPT | Performed by: STUDENT IN AN ORGANIZED HEALTH CARE EDUCATION/TRAINING PROGRAM

## 2024-04-15 PROCEDURE — 94760 N-INVAS EAR/PLS OXIMETRY 1: CPT

## 2024-04-15 PROCEDURE — 25000003 PHARM REV CODE 250: Performed by: STUDENT IN AN ORGANIZED HEALTH CARE EDUCATION/TRAINING PROGRAM

## 2024-04-15 PROCEDURE — 84300 ASSAY OF URINE SODIUM: CPT

## 2024-04-15 PROCEDURE — 83935 ASSAY OF URINE OSMOLALITY: CPT

## 2024-04-15 PROCEDURE — G0378 HOSPITAL OBSERVATION PER HR: HCPCS

## 2024-04-15 RX ORDER — ALPRAZOLAM 0.5 MG/1
1 TABLET ORAL 2 TIMES DAILY PRN
Status: DISCONTINUED | OUTPATIENT
Start: 2024-04-15 | End: 2024-04-16 | Stop reason: HOSPADM

## 2024-04-15 RX ORDER — NALOXONE HCL 0.4 MG/ML
0.02 VIAL (ML) INJECTION
Status: DISCONTINUED | OUTPATIENT
Start: 2024-04-15 | End: 2024-04-16 | Stop reason: HOSPADM

## 2024-04-15 RX ORDER — OLANZAPINE 5 MG/1
5 TABLET ORAL NIGHTLY
Status: DISCONTINUED | OUTPATIENT
Start: 2024-04-15 | End: 2024-04-15

## 2024-04-15 RX ORDER — HYDROCODONE BITARTRATE AND ACETAMINOPHEN 5; 325 MG/1; MG/1
1 TABLET ORAL EVERY 6 HOURS PRN
Status: DISCONTINUED | OUTPATIENT
Start: 2024-04-15 | End: 2024-04-16 | Stop reason: HOSPADM

## 2024-04-15 RX ORDER — FOLIC ACID 1 MG/1
1 TABLET ORAL DAILY
Status: DISCONTINUED | OUTPATIENT
Start: 2024-04-15 | End: 2024-04-16 | Stop reason: HOSPADM

## 2024-04-15 RX ORDER — ONDANSETRON HYDROCHLORIDE 2 MG/ML
4 INJECTION, SOLUTION INTRAVENOUS EVERY 8 HOURS PRN
Status: DISCONTINUED | OUTPATIENT
Start: 2024-04-15 | End: 2024-04-16 | Stop reason: HOSPADM

## 2024-04-15 RX ORDER — ACETAMINOPHEN 325 MG/1
650 TABLET ORAL EVERY 4 HOURS PRN
Status: DISCONTINUED | OUTPATIENT
Start: 2024-04-15 | End: 2024-04-15

## 2024-04-15 RX ORDER — BISACODYL 10 MG/1
10 SUPPOSITORY RECTAL DAILY PRN
Status: DISCONTINUED | OUTPATIENT
Start: 2024-04-15 | End: 2024-04-16 | Stop reason: HOSPADM

## 2024-04-15 RX ORDER — FAMOTIDINE 20 MG/1
20 TABLET, FILM COATED ORAL 2 TIMES DAILY
Status: DISCONTINUED | OUTPATIENT
Start: 2024-04-15 | End: 2024-04-16 | Stop reason: HOSPADM

## 2024-04-15 RX ORDER — SODIUM CHLORIDE 9 MG/ML
1000 INJECTION, SOLUTION INTRAVENOUS CONTINUOUS
Status: ACTIVE | OUTPATIENT
Start: 2024-04-15 | End: 2024-04-15

## 2024-04-15 RX ORDER — LOSARTAN POTASSIUM 50 MG/1
100 TABLET ORAL DAILY
Status: DISCONTINUED | OUTPATIENT
Start: 2024-04-18 | End: 2024-04-15

## 2024-04-15 RX ORDER — POLYETHYLENE GLYCOL 3350 17 G/17G
17 POWDER, FOR SOLUTION ORAL 3 TIMES DAILY PRN
Status: DISCONTINUED | OUTPATIENT
Start: 2024-04-15 | End: 2024-04-16 | Stop reason: HOSPADM

## 2024-04-15 RX ORDER — SODIUM CHLORIDE 1 G/1
1000 TABLET ORAL 2 TIMES DAILY
Status: DISCONTINUED | OUTPATIENT
Start: 2024-04-15 | End: 2024-04-16 | Stop reason: HOSPADM

## 2024-04-15 RX ORDER — HYDROXYZINE PAMOATE 50 MG/1
100 CAPSULE ORAL NIGHTLY
Status: DISCONTINUED | OUTPATIENT
Start: 2024-04-15 | End: 2024-04-16 | Stop reason: HOSPADM

## 2024-04-15 RX ORDER — SODIUM CHLORIDE 0.9 % (FLUSH) 0.9 %
10 SYRINGE (ML) INJECTION
Status: DISCONTINUED | OUTPATIENT
Start: 2024-04-15 | End: 2024-04-16 | Stop reason: HOSPADM

## 2024-04-15 RX ORDER — ONDANSETRON 4 MG/1
8 TABLET, ORALLY DISINTEGRATING ORAL EVERY 8 HOURS PRN
Status: DISCONTINUED | OUTPATIENT
Start: 2024-04-15 | End: 2024-04-16 | Stop reason: HOSPADM

## 2024-04-15 RX ORDER — MORPHINE SULFATE 4 MG/ML
2 INJECTION, SOLUTION INTRAMUSCULAR; INTRAVENOUS EVERY 6 HOURS PRN
Status: DISCONTINUED | OUTPATIENT
Start: 2024-04-15 | End: 2024-04-16 | Stop reason: HOSPADM

## 2024-04-15 RX ORDER — ALUMINUM HYDROXIDE, MAGNESIUM HYDROXIDE, AND SIMETHICONE 1200; 120; 1200 MG/30ML; MG/30ML; MG/30ML
30 SUSPENSION ORAL 4 TIMES DAILY PRN
Status: DISCONTINUED | OUTPATIENT
Start: 2024-04-15 | End: 2024-04-16 | Stop reason: HOSPADM

## 2024-04-15 RX ORDER — THIAMINE HCL 100 MG
100 TABLET ORAL DAILY
Status: DISCONTINUED | OUTPATIENT
Start: 2024-04-15 | End: 2024-04-16 | Stop reason: HOSPADM

## 2024-04-15 RX ORDER — SIMETHICONE 80 MG
1 TABLET,CHEWABLE ORAL 4 TIMES DAILY PRN
Status: DISCONTINUED | OUTPATIENT
Start: 2024-04-15 | End: 2024-04-16 | Stop reason: HOSPADM

## 2024-04-15 RX ORDER — IPRATROPIUM BROMIDE AND ALBUTEROL SULFATE 2.5; .5 MG/3ML; MG/3ML
3 SOLUTION RESPIRATORY (INHALATION) EVERY 6 HOURS PRN
Status: DISCONTINUED | OUTPATIENT
Start: 2024-04-15 | End: 2024-04-16 | Stop reason: HOSPADM

## 2024-04-15 RX ORDER — TRAZODONE HYDROCHLORIDE 50 MG/1
100 TABLET ORAL NIGHTLY
Status: DISCONTINUED | OUTPATIENT
Start: 2024-04-15 | End: 2024-04-16 | Stop reason: HOSPADM

## 2024-04-15 RX ORDER — NIFEDIPINE 30 MG/1
30 TABLET, EXTENDED RELEASE ORAL DAILY
Status: DISCONTINUED | OUTPATIENT
Start: 2024-04-15 | End: 2024-04-16 | Stop reason: HOSPADM

## 2024-04-15 RX ADMIN — SODIUM CHLORIDE 1000 MG: 1 TABLET ORAL at 08:04

## 2024-04-15 RX ADMIN — PANTOPRAZOLE SODIUM 40 MG: 40 TABLET, DELAYED RELEASE ORAL at 08:04

## 2024-04-15 RX ADMIN — THIAMINE HCL TAB 100 MG 100 MG: 100 TAB at 11:04

## 2024-04-15 RX ADMIN — MULTIPLE VITAMINS W/ MINERALS TAB 1 TABLET: TAB at 11:04

## 2024-04-15 RX ADMIN — FAMOTIDINE 20 MG: 20 TABLET, FILM COATED ORAL at 08:04

## 2024-04-15 RX ADMIN — TRAZODONE HYDROCHLORIDE 100 MG: 50 TABLET ORAL at 08:04

## 2024-04-15 RX ADMIN — FOLIC ACID 1 MG: 1 TABLET ORAL at 08:04

## 2024-04-15 RX ADMIN — NIFEDIPINE 30 MG: 30 TABLET, FILM COATED, EXTENDED RELEASE ORAL at 08:04

## 2024-04-15 RX ADMIN — SODIUM CHLORIDE 1000 ML: 9 INJECTION, SOLUTION INTRAVENOUS at 10:04

## 2024-04-15 RX ADMIN — HYDROXYZINE PAMOATE 100 MG: 50 CAPSULE ORAL at 08:04

## 2024-04-15 NOTE — H&P
"[facility]  [facility] MEDICINE - H&P ADMISSION NOTE    Patient Name: Coral Emerson Sr.  MRN: [id number]  Patient Class: OP- Observation   Admission Date: 4/14/2024   Admitting Physician: CALLUM Service   Attending Physician: Thairy G Reyes, DO  Primary Care Provider: Raquel Duke MD  Face-to-Face encounter date: 04/15/2024      CHIEF COMPLAINT     Chief Complaint   Patient presents with    Seizures     Pt has hx of seizures, pt states 40 min PTA, pt was sitting on a swing when he had a seizure, per pt seizure was witnessed lasting 30 sec. pt denies hitting head, Pt states he is out of his medication.      HISTORY OF PRESENTING ILLNESS    63-year-old male with a past medical history chronic hyponatremia, essential hypertension, mood disorder, former ETOH dependence with history of withdrawal seizure, illicit drug use who presents with complaint of an episode of "staring off into space" with associated dizziness.  Workup in the emergency room revealed hyponatremia and he was admitted for further management of electrolyte derangements. Patient reports last illicit drug use was approximately 1 week ago and he has decreased ETOH use to once a week one beer.  PAST MEDICAL HISTORY     Past Medical History:   Diagnosis Date    Seizures        PAST SURGICAL HISTORY     Past Surgical History:   Procedure Laterality Date    SPINE SURGERY         FAMILY HISTORY   Reviewed and noncontributory to this case    SOCIAL HISTORY     Social History     Socioeconomic History    Marital status:    Tobacco Use    Smoking status: Every Day     Current packs/day: 1.00     Types: Cigarettes    Smokeless tobacco: Never   Substance and Sexual Activity    Alcohol use: Yes     Comment: Socially     Social Determinants of Health     Financial Resource Strain: Medium Risk (9/3/2023)    Received from [facility] and Its Subsidiaries and Affiliates, [facility]    Overall Financial Resource Strain (CARDIA)     Difficulty of Paying Living Expenses: Somewhat hard   Food Insecurity: Patient Declined (9/3/2023)    [facility], [facility]    Hunger Vital Sign     Worried About Running Out of Food in the Last Year: Patient declined     Ran Out of Food in the Last Year: Patient declined   Transportation Needs: No Transportation Needs (9/3/2023)    [facility], [facility]    PRAPARE - Transportation     Lack of Transportation (Medical): No     Lack of Transportation (Non-Medical): No   Stress: Stress Concern Present (9/3/2023)    Received from [facility] and [facility]ates, [facility]    [place] of Occupational Health - Occupational Stress Questionnaire     Feeling of Stress : Rather much   Social Connections: Moderately Isolated (9/3/2023)    [facility], [facility]    Social Connection and Isolation Panel [NHANES]     Frequency of Communication with Friends and Family: More than three times a week     Frequency of Social Gatherings with Friends and Family: More than three times a week     Attends Holiness Services: 1 to 4 times per year     Active Member of Clubs or Organizations: No     Attends Club or Organization Meetings: Never     Marital Status:    Housing Stability:  Unknown (9/3/2023)    Received from [facility] and Affiliates, [facility] and Its Subsidiaries and Affiliates    Housing Stability Vital Sign     Unable to Pay for Housing in the Last Year: No       HOME MEDICATIONS     Prior to Admission medications    Medication Sig Start Date End Date Taking? Authorizing Provider   ALPRAZolam (XANAX) 1 MG tablet Take one tablet by mouth twice daily 12/27/23   Raquel Duke MD   famotidine (PEPCID) 20 MG tablet Take 1 tablet (20 mg total) by mouth 2 (two) times daily. 2/5/24   Raquel Duke MD   folic acid (FOLVITE) 1 MG tablet Take 1 mg by mouth once daily.    Provider, Historical   hydrOXYzine pamoate (VISTARIL) 50 MG Cap Take 100 mg by mouth nightly.    Provider, Historical   losartan (COZAAR) 100 MG tablet Take 1 tablet (100 mg total) by mouth once daily. 2/5/24   Raquel Duke MD   NIFEdipine (ADALAT CC) 30 MG TbSR Take 30 mg by mouth once daily.    Provider, Historical   OLANZapine (ZYPREXA) 5 MG tablet Take 5 mg by mouth every evening.    Provider, Historical   pantoprazole (PROTONIX) 40 MG tablet Take 40 mg by mouth once daily.  Before breakfast    Provider, Historical   traZODone (DESYREL) 100 MG tablet Take 100 mg by mouth every evening.    Provider, Historical       ALLERGIES   Patient has no known allergies.          REVIEW OF SYSTEMS   Except as documented above, all other systems reviewed and negative    PHYSICAL EXAM     Vitals:    04/15/24 0937   BP:    Pulse: 80   Resp: 18   Temp:       General:  In no acute distress, resting comfortably  Head and neck:  Atraumatic, normocephalic, moist mucous membranes, supple neck  Chest:  Clear to auscultation bilaterally  Heart:  S1, S2, no appreciable murmur  Abdomen:  Soft, nontender, BS +  MSK:  Warm, no lower extremity edema, no clubbing or cyanosis  Neuro:  Alert and oriented x4, moving all extremities with  good strength  Integumentary:  No obvious skin rash  Psychiatry:  Appropriate mood and affect  ASSESSMENT AND PLAN   Acute on chronic hyponatremia   ETOH dependence, illicit drug use   Medication noncompliance  -Had an admission as recent as August 2023 where he was evaluated by Nephrology for hyponatremia 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  -Continue normal saline for 10 more hours at a rate of 100   -fluid restrict to 1500  -repeat BMP in a.m., if within normal limits can discharge to home  -Recommend ETOH and polysubstance use cessation    Metabolic encephalopathy   History of withdrawal seizure  -patient had a seizure suspected to be secondary to hyponatremia and ETOH withdrawal, on no antiepileptics  -do not suspect episode that brought him to the ED was seizure related but rather metabolic encephalopathy from hyponatremia and polysubstance use    Mood disorder   -Patient with olanzapine at home, consult tele psychiatry for re-evaluation of this medication given concurrent hyponatremia     Essential hypertension  -Patient on nifedipine and losartan at home   -Discontinue losartan given persistent hyponatremia, well controlled on nifedipine only at this time      DVT prophylaxis:  encourage ambulation   Admit observation status under my care  Critical care time 40 minutes for hyponatremia  __________________________________________________________________  LABS/MICRO/MEDS/DIAGNOSTICS       LABS  Recent Labs     04/14/24  2105 04/15/24  0420   * 130*   K 3.6 3.6   CHLORIDE 87* 98   CO2 24 22*   BUN 7.4* 5.3*   CREATININE 0.87 0.73   GLUCOSE 99 89   CALCIUM 9.7 9.1   ALKPHOS 126  --    AST 29  --    ALT 21  --    ALBUMIN 4.3  --      Recent Labs     04/14/24 2105   WBC 9.43   RBC 4.18*   HCT 36.9*   MCV 88.3           MICROBIOLOGY  Microbiology Results (last 7 days)       ** No results found for the last 168 hours. **            MEDICATIONS  Current Facility-Administered Medications   Medication Dose Route Frequency Provider Last Rate Last Admin    0.9%  NaCl infusion  1,000 mL Intravenous Continuous Reyes, Thairy G,  mL/hr at 04/15/24 1035 1,000 mL at 04/15/24 1035    acetaminophen tablet 650 mg  650 mg Oral Q4H PRN Otis Quintero MD        albuterol-ipratropium 2.5 mg-0.5 mg/3 mL nebulizer solution 3 mL  3 mL Nebulization Q6H PRN Reyes, Thairy G, DO        ALPRAZolam tablet 1 mg  1 mg Oral BID PRN Reyes, Thairy G, DO        aluminum-magnesium hydroxide-simethicone 200-200-20 mg/5 mL suspension 30 mL  30 mL Oral QID PRN Reyes, Thairy G, DO        benzonatate capsule 200 mg  200 mg Oral TID PRN Otis Quintero MD        bisacodyL suppository 10 mg  10 mg Rectal Daily PRN Reyes, Thairy G, DO        calcium carbonate 200 mg calcium (500 mg) chewable tablet 1,000 mg  1,000 mg Oral TID PRN Otis Quintero MD        famotidine tablet 20 mg  20 mg Oral BID Reyes, Thairy G, DO   20 mg at 04/15/24 0853    folic acid tablet 1 mg  1 mg Oral Daily Reyes, Thairy G, DO   1 mg at 04/15/24 0853    guaiFENesin 100 mg/5 ml syrup 200 mg  200 mg Oral Q4H PRN Otis Quintero MD        HYDROcodone-acetaminophen 5-325 mg per tablet 1 tablet  1 tablet Oral Q6H PRN Reyes, Thairy G, DO        hydrOXYzine pamoate capsule 100 mg  100 mg Oral Nightly Reyes, Thairy G, DO        melatonin tablet 9 mg  9 mg Oral Nightly PRN Otis Quintero MD        morphine injection 2 mg  2 mg Intravenous Q6H PRN Reyes, Thairy G, DO        naloxone 0.4 mg/mL injection 0.02 mg  0.02 mg Intravenous PRN Reyes, Thairy G, DO        NIFEdipine 24 hr tablet 30 mg  30 mg Oral Daily Reyes, Thairy G, DO   30 mg at 04/15/24 0853    OLANZapine tablet 5 mg  5 mg Oral QHS Reyes, Thairy G, DO        ondansetron disintegrating tablet 8 mg  8 mg Oral Q8H PRN Reyes, Thairy  G, DO        ondansetron injection 4 mg  4 mg Intravenous Q8H PRN Reyes, Thairy G, DO        pantoprazole EC tablet 40 mg  40 mg Oral Daily Otis Quintero MD   40 mg at 04/15/24 0853    polyethylene glycol packet 17 g  17 g Oral TID PRN Reyes, Thairy G, DO        simethicone chewable tablet 80 mg  1 tablet Oral QID PRN Reyes, Thairy G, DO        sodium chloride 0.9% flush 10 mL  10 mL Intravenous PRN Reyes, Thairy G, DO        sodium chloride oral tablet 1,000 mg  1,000 mg Oral BID Reyes, Thairy G, DO   1,000 mg at 04/15/24 0853    traZODone tablet 100 mg  100 mg Oral QHS Reyes, Thairy G, DO          INFUSIONS  Current Facility-Administered Medications   Medication Dose Route Frequency Provider Last Rate Last Admin    0.9%  NaCl infusion  1,000 mL Intravenous Continuous Reyes, Thairy G,  mL/hr at 04/15/24 1035 1,000 mL at 04/15/24 1035    acetaminophen tablet 650 mg  650 mg Oral Q4H PRN Otis Quintero MD        albuterol-ipratropium 2.5 mg-0.5 mg/3 mL nebulizer solution 3 mL  3 mL Nebulization Q6H PRN Reyes, Thairy G, DO        ALPRAZolam tablet 1 mg  1 mg Oral BID PRN Reyes, Thairy G, DO        aluminum-magnesium hydroxide-simethicone 200-200-20 mg/5 mL suspension 30 mL  30 mL Oral QID PRN Reyes, Thairy G,         benzonatate capsule 200 mg  200 mg Oral TID PRN Otis Quintero MD        bisacodyL suppository 10 mg  10 mg Rectal Daily PRN Reyes, Thairy G, DO        calcium carbonate 200 mg calcium (500 mg) chewable tablet 1,000 mg  1,000 mg Oral TID PRN Otis Quintero MD        famotidine tablet 20 mg  20 mg Oral BID Reyes, Thairy G, DO   20 mg at 04/15/24 0853    folic acid tablet 1 mg  1 mg Oral Daily Reyes, Thairy G, DO   1 mg at 04/15/24 0853    guaiFENesin 100 mg/5 ml syrup 200 mg  200 mg Oral Q4H PRN Otis Quintero MD        HYDROcodone-acetaminophen 5-325 mg per tablet 1 tablet  1 tablet Oral Q6H PRN Reyes, Thairy G, DO        hydrOXYzine pamoate capsule 100 mg  100 mg Oral Nightly Reyes,  Bartoloiggy G, DO        melatonin tablet 9 mg  9 mg Oral Nightly PRN Otis Quintero MD        morphine injection 2 mg  2 mg Intravenous Q6H PRN Reyes Mikey G, DO        naloxone 0.4 mg/mL injection 0.02 mg  0.02 mg Intravenous PRN Reyes, Mikey G, DO        NIFEdipine 24 hr tablet 30 mg  30 mg Oral Daily ReyesMikey G, DO   30 mg at 04/15/24 0853    OLANZapine tablet 5 mg  5 mg Oral QHS Reyes, Mikey G, DO        ondansetron disintegrating tablet 8 mg  8 mg Oral Q8H PRN Reyes, Bartoloiggy G, DO        ondansetron injection 4 mg  4 mg Intravenous Q8H PRN ReyesBartoloiggy G, DO        pantoprazole EC tablet 40 mg  40 mg Oral Daily Otis Quintero MD   40 mg at 04/15/24 0853    polyethylene glycol packet 17 g  17 g Oral TID PRN ReyesBartoloiggy G, DO        simethicone chewable tablet 80 mg  1 tablet Oral QID PRN ReyesBartolo rodrígueziggy G, DO        sodium chloride 0.9% flush 10 mL  10 mL Intravenous PRN ReyesMikey G, DO        sodium chloride oral tablet 1,000 mg  1,000 mg Oral BID ReyesMikey G, DO   1,000 mg at 04/15/24 0853    traZODone tablet 100 mg  100 mg Oral QHS Reyes, Bartoloiggy G, DO           DIAGNOSTIC TESTS  No orders to display          Patient information was obtained from patient, patient's family, past medical records and ER records.   All diagnosis and differential diagnosis have been reviewed; assessment and plan has been documented. I have personally reviewed the labs and test results that are presently available; I have reviewed the patients medication list. I have reviewed the consulting providers response and recommendations. I have reviewed or attempted to review medical records based upon their availability.  All of the patient's questions have been addressed and answered. Patient's is agreeable to the above stated plan. I will continue to monitor closely and make adjustments to medical management as needed.  This note was created using [employer] voice recognition software that occasionally misinterpreted phrases  or words.  Please contact me if any questions may rise regarding documentation to clarify verbiage.        Thairy G Reyes, DO   Internal Medicine  Department of Hospital Medicine [facility]

## 2024-04-15 NOTE — PLAN OF CARE
[facility]  Discharge Reassessment    Primary Care Provider: Raquel Duke MD    Expected Discharge Date: 4/16/2024    Reassessment (most recent)       Discharge Reassessment - 04/15/24 1808          Discharge Reassessment    Assessment Type Discharge Planning Reassessment     Did the patient's condition or plan change since previous assessment? No     Discharge Plan discussed with: Adult children     Communicated THAO with patient/caregiver Date not available/Unable to determine     Discharge Plan A Home with family     DME Needed Upon Discharge  none     Transition of Care Barriers None     Why the patient remains in the hospital Requires continued medical care        Post-Acute Status    Hospital Resources/Appts/Education Provided Provided patient/caregiver with written discharge plan information     Discharge Delays None known at this time

## 2024-04-15 NOTE — PLAN OF CARE
Problem: Adult Inpatient Plan of Care  Goal: Plan of Care Review  Outcome: Ongoing, Progressing  Goal: Patient-Specific Goal (Individualized)  Outcome: Ongoing, Progressing  Flowsheets (Taken 4/15/2024 1900)  Anxieties, Fears or Concerns: none expressed  Individualized Care Needs: IV fluids, monitor lab work  Goal: Absence of Hospital-Acquired Illness or Injury  Outcome: Ongoing, Progressing  Intervention: Identify and Manage Fall Risk  Flowsheets (Taken 4/15/2024 1900)  Safety Promotion/Fall Prevention:   assistive device/personal item within reach   bed alarm set   nonskid shoes/socks when out of bed   side rails raised x 2  Goal: Optimal Comfort and Wellbeing  Outcome: Ongoing, Progressing  Goal: Readiness for Transition of Care  Outcome: Ongoing, Progressing     Problem: Electrolyte Imbalance  Goal: Electrolyte Balance  Outcome: Ongoing, Progressing  Intervention: Monitor and Manage Electrolyte Imbalance  Flowsheets (Taken 4/15/2024 1900)  Fluid/Electrolyte Management: fluids restricted     Problem: Fall Injury Risk  Goal: Absence of Fall and Fall-Related Injury  Outcome: Ongoing, Progressing  Intervention: Identify and Manage Contributors  Flowsheets (Taken 4/15/2024 1900)  Self-Care Promotion:   independence encouraged   safe use of adaptive equipment encouraged   BADL personal objects within reach  Medication Review/Management: medications reviewed  Intervention: Promote Injury-Free Environment  Flowsheets (Taken 4/15/2024 1900)  Safety Promotion/Fall Prevention:   assistive device/personal item within reach   bed alarm set   nonskid shoes/socks when out of bed   side rails raised x 2     Problem: Infection  Goal: Absence of Infection Signs and Symptoms  Outcome: Ongoing, Progressing  Intervention: Prevent or Manage Infection  Flowsheets (Taken 4/15/2024 1900)  Infection Management: aseptic technique maintained  Isolation Precautions: protective

## 2024-04-15 NOTE — CONSULTS
"4/15/2024  Coral Emerson Sr.   1960   [id number]            Psychiatry Initial Consult Note    Date of Admission: 4/14/2024  7:48 PM    Chief Complaint: Psychiatric consult for "olanzapine and hyponatremia"    SUBJECTIVE:   History of Present Illness:   Coral Emerson Sr. is a 63 y.o. male with a history of chronic hyponatremia, HTN, polysubstance use and ETOH use with withdrawal seizures who presented to the ED with "staring off into space" and associated dizziness. Seen over telemedicine where he reports a history of depressed mood, but that mood has been euthymic. Guarded during interview and appears to minimize. When asked about drug use he states that he uses cocaine and marijuana rarely and that he had not  used recently. When told his UDS was positive for cocaine, cannabinoids, and amphetamines he reports he must have used recently, but does not recall when. States he has not smoked in twenty years, but reported at a recent hospitalization that he was a PPD smoker. Also states he only drinks one a month, but was drinking 6-12 beers a day as lately as February. It does appear he was transferred to [facility] in February for outpatient substance treatment. Was at [facility] in February for seizure like activity and psychiatry saw for delusional ideations and psychosis. Was started on olanzapine at that time. He reports he has not taken his medications for the past week due to running out of them. Review of labs shows sodium below 130 frequently for past seven months. He displays a linear thought process without evidence of psychosis. Denies hallucinations or paranoia. Oriented to person, place, and time and no issues noted with attention or memory. Denies SI/HI.        Past Psychiatric History:   Previous Psychiatric Hospitalizations: Denies  Previous Medication Trials: Zyprexa, alprazolam, trazodone  Previous Suicide Attempts: Denies  Outpatient psychiatrist: None    Past Medical/Surgical History: " "  Past Medical History:   Diagnosis Date    Seizures      Past Surgical History:   Procedure Laterality Date    SPINE SURGERY           Family Psychiatric History:   Unknown     Allergies:   Review of patient's allergies indicates:  No Known Allergies    Substance Abuse History:   Tobacco: Former. Last use twenty years ago.  Alcohol: "not very often. I'd say once a month"  Illicit Substances: Former. Reports last use twenty years ago.  Treatment: Reports rehab x 2. Unable to recall last visit      Current Medications:   Home Psychiatric Meds: Olanzapine 5mg PO QHS, trazodone 100mg PO QHS    Scheduled Meds:   Current Facility-Administered Medications   Medication Dose Route Frequency Provider Last Rate Last Admin    0.9%  NaCl infusion  1,000 mL Intravenous Continuous Reyes, Thairy G,  mL/hr at 04/15/24 1035 1,000 mL at 04/15/24 1035    acetaminophen tablet 650 mg  650 mg Oral Q4H PRN Otis Quintero MD        albuterol-ipratropium 2.5 mg-0.5 mg/3 mL nebulizer solution 3 mL  3 mL Nebulization Q6H PRN Reyes, Thairy G, DO        ALPRAZolam tablet 1 mg  1 mg Oral BID PRN Reyes, Thairy G, DO        aluminum-magnesium hydroxide-simethicone 200-200-20 mg/5 mL suspension 30 mL  30 mL Oral QID PRN Reyes, Thairy G,         benzonatate capsule 200 mg  200 mg Oral TID PRN Otis Quintero MD        bisacodyL suppository 10 mg  10 mg Rectal Daily PRN Reyes, Thairy G,         calcium carbonate 200 mg calcium (500 mg) chewable tablet 1,000 mg  1,000 mg Oral TID PRN Otis Quintero MD        famotidine tablet 20 mg  20 mg Oral BID Reyes, Thairy G, DO   20 mg at 04/15/24 0853    folic acid tablet 1 mg  1 mg Oral Daily Reyes, Thairy G, DO   1 mg at 04/15/24 0853    guaiFENesin 100 mg/5 ml syrup 200 mg  200 mg Oral Q4H PRN Otis Quintero MD        HYDROcodone-acetaminophen 5-325 mg per tablet 1 tablet  1 tablet Oral Q6H PRN Reyes, Thairy G,         hydrOXYzine pamoate capsule 100 mg  100 mg Oral Nightly Reyes, Thairy " ASTER, DO        melatonin tablet 9 mg  9 mg Oral Nightly PRN Otis Quintero MD        morphine injection 2 mg  2 mg Intravenous Q6H PRN Reyes, Thairy G, DO        multivitamin tablet  1 tablet Oral Daily Reyes, Thairy G, DO   1 tablet at 04/15/24 1148    naloxone 0.4 mg/mL injection 0.02 mg  0.02 mg Intravenous PRN Reyes, Thairy G, DO        NIFEdipine 24 hr tablet 30 mg  30 mg Oral Daily Reyes, Thairy G, DO   30 mg at 04/15/24 0853    OLANZapine tablet 5 mg  5 mg Oral QHS Reyes, Thairy G, DO        ondansetron disintegrating tablet 8 mg  8 mg Oral Q8H PRN Reyes, Thairy G, DO        ondansetron injection 4 mg  4 mg Intravenous Q8H PRN Reyes, Thairy G, DO        pantoprazole EC tablet 40 mg  40 mg Oral Daily Otis Quintero MD   40 mg at 04/15/24 0853    polyethylene glycol packet 17 g  17 g Oral TID PRN Reyes, Thairy G, DO        simethicone chewable tablet 80 mg  1 tablet Oral QID PRN Reyes, Thairy G, DO        sodium chloride 0.9% flush 10 mL  10 mL Intravenous PRN Reyes, Thairy G, DO        sodium chloride oral tablet 1,000 mg  1,000 mg Oral BID Reyes, Thairy G, DO   1,000 mg at 04/15/24 0853    thiamine tablet 100 mg  100 mg Oral Daily Reyes, Thairy G, DO   100 mg at 04/15/24 1148    traZODone tablet 100 mg  100 mg Oral QHS Reyes, Thairy G, DO          PRN Meds:   Current Facility-Administered Medications   Medication Dose Route Frequency Provider Last Rate Last Admin    0.9%  NaCl infusion  1,000 mL Intravenous Continuous Reyes, Thairy G,  mL/hr at 04/15/24 1035 1,000 mL at 04/15/24 1035    acetaminophen tablet 650 mg  650 mg Oral Q4H PRN Otis Quintero MD        albuterol-ipratropium 2.5 mg-0.5 mg/3 mL nebulizer solution 3 mL  3 mL Nebulization Q6H PRN Reyes, Thairy G, DO        ALPRAZolam tablet 1 mg  1 mg Oral BID PRN Reyes, Thairy G, DO        aluminum-magnesium hydroxide-simethicone 200-200-20 mg/5 mL suspension 30 mL  30 mL Oral QID PRN Reyes, Thairy G, DO        benzonatate capsule 200 mg  200 mg  Oral TID PRN Otis Quintero MD        bisacodyL suppository 10 mg  10 mg Rectal Daily PRN Reyes, Thairy G, DO        calcium carbonate 200 mg calcium (500 mg) chewable tablet 1,000 mg  1,000 mg Oral TID PRN Otis Quintero MD        famotidine tablet 20 mg  20 mg Oral BID Reyes, Thairy G, DO   20 mg at 04/15/24 0853    folic acid tablet 1 mg  1 mg Oral Daily Reyes, Thairy G, DO   1 mg at 04/15/24 0853    guaiFENesin 100 mg/5 ml syrup 200 mg  200 mg Oral Q4H PRN Otis Quintero MD        HYDROcodone-acetaminophen 5-325 mg per tablet 1 tablet  1 tablet Oral Q6H PRN Reyes, Thairy G, DO        hydrOXYzine pamoate capsule 100 mg  100 mg Oral Nightly Reyes, Thairy G, DO        melatonin tablet 9 mg  9 mg Oral Nightly PRN Otis Quintero MD        morphine injection 2 mg  2 mg Intravenous Q6H PRN Reyes, Thairy G, DO        multivitamin tablet  1 tablet Oral Daily Reyes, Thairy G, DO   1 tablet at 04/15/24 1148    naloxone 0.4 mg/mL injection 0.02 mg  0.02 mg Intravenous PRN Reyes, Thairy G, DO        NIFEdipine 24 hr tablet 30 mg  30 mg Oral Daily Reyes, Thairy G, DO   30 mg at 04/15/24 0853    OLANZapine tablet 5 mg  5 mg Oral QHS Reyes, Thairy G, DO        ondansetron disintegrating tablet 8 mg  8 mg Oral Q8H PRN Reyes, Thairy G, DO        ondansetron injection 4 mg  4 mg Intravenous Q8H PRN Reyes, Thairy G, DO        pantoprazole EC tablet 40 mg  40 mg Oral Daily Otis Quintero MD   40 mg at 04/15/24 0853    polyethylene glycol packet 17 g  17 g Oral TID PRN Reyes, Thairy G, DO        simethicone chewable tablet 80 mg  1 tablet Oral QID PRN Reyes, Thairy G, DO        sodium chloride 0.9% flush 10 mL  10 mL Intravenous PRN Reyes, Thairy G, DO        sodium chloride oral tablet 1,000 mg  1,000 mg Oral BID Reyes, Thairy G, DO   1,000 mg at 04/15/24 0853    thiamine tablet 100 mg  100 mg Oral Daily Reyes, Thairy G, DO   100 mg at 04/15/24 1148    traZODone tablet 100 mg  100 mg Oral QHS Reyes, Thairy G, DO           Psychotherapeutics (From admission, onward)      Start     Stop Route Frequency Ordered    04/15/24 2100  OLANZapine tablet 5 mg         -- Oral Nightly 04/15/24 0730    04/15/24 2100  traZODone tablet 100 mg         -- Oral Nightly 04/15/24 0730    04/15/24 0828  ALPRAZolam tablet 1 mg         -- Oral 2 times daily PRN 04/15/24 0730              Social History:  Housing Status: Lives alone  Relationship Status/Sexual Orientation:    Children: 2 (1 step child)  Education: High school graduate  Employment Status/Info: Retired     history: Denies  History of physical/sexual abuse: Denies   Access to gun: Yes      Legal History:   Past Charges/Incarcerations: Previous arrest for pot   Pending charges: Denies      OBJECTIVE:       Vitals   Vitals:    04/15/24 1110   BP: 107/66   Pulse: 84   Resp:    Temp:         Labs/Imaging/Studies:   Recent Results (from the past 48 hour(s))   Comprehensive metabolic panel    Collection Time: 04/14/24  9:05 PM   Result Value Ref Range    Sodium Level 124 (L) 136 - 145 mmol/L    Potassium Level 3.6 3.5 - 5.1 mmol/L    Chloride 87 (L) 98 - 107 mmol/L    Carbon Dioxide 24 23 - 31 mmol/L    Glucose Level 99 82 - 115 mg/dL    Blood Urea Nitrogen 7.4 (L) 8.4 - 25.7 mg/dL    Creatinine 0.87 0.73 - 1.18 mg/dL    Calcium Level Total 9.7 8.8 - 10.0 mg/dL    Protein Total 7.3 5.8 - 7.6 gm/dL    Albumin Level 4.3 3.4 - 4.8 g/dL    Globulin 3.0 2.4 - 3.5 gm/dL    Albumin/Globulin Ratio 1.4 1.1 - 2.0 ratio    Bilirubin Total 0.4 <=1.5 mg/dL    Alkaline Phosphatase 126 40 - 150 unit/L    Alanine Aminotransferase 21 0 - 55 unit/L    Aspartate Aminotransferase 29 5 - 34 unit/L    eGFR >60 mls/min/1.73/m2   CBC with Differential    Collection Time: 04/14/24  9:05 PM   Result Value Ref Range    WBC 9.43 4.50 - 11.50 x10(3)/mcL    RBC 4.18 (L) 4.70 - 6.10 x10(6)/mcL    Hgb 13.0 (L) 14.0 - 18.0 g/dL    Hct 36.9 (L) 42.0 - 52.0 %    MCV 88.3 80.0 - 94.0 fL    MCH 31.1 (H) 27.0 -  31.0 pg    MCHC 35.2 33.0 - 36.0 g/dL    RDW 11.6 11.5 - 17.0 %    Platelet 245 130 - 400 x10(3)/mcL    MPV 9.2 7.4 - 10.4 fL    Neut % 79.3 %    Lymph % 12.8 %    Mono % 7.1 %    Eos % 0.2 %    Basophil % 0.2 %    Lymph # 1.21 0.6 - 4.6 x10(3)/mcL    Neut # 7.47 2.1 - 9.2 x10(3)/mcL    Mono # 0.67 0.1 - 1.3 x10(3)/mcL    Eos # 0.02 0 - 0.9 x10(3)/mcL    Baso # 0.02 <=0.2 x10(3)/mcL    IG# 0.04 0 - 0.04 x10(3)/mcL    IG% 0.4 %   Drug Screen, Urine    Collection Time: 04/14/24  9:07 PM   Result Value Ref Range    Amphetamines, Urine Positive (A) Negative    Barbituates, Urine Negative Negative    Benzodiazepine, Urine Positive (A) Negative    Cannabinoids, Urine Positive (A) Negative    Cocaine, Urine Positive (A) Negative    Opiates, Urine Negative Negative    Phencyclidine, Urine Negative Negative    pH, Urine 7.0 3.0 - 11.0    Specific Gravity, Urine Auto 1.020 1.001 - 1.035   Urinalysis, Reflex to Urine Culture    Collection Time: 04/14/24  9:07 PM    Specimen: Urine, Clean Catch   Result Value Ref Range    Color, UA Yellow Yellow, Light-Yellow, Dark Yellow, Cat, Straw    Appearance, UA Clear Clear    Specific Gravity, UA 1.020 1.005 - 1.030    pH, UA 7.0 5.0 - 8.5    Protein, UA Negative Negative    Glucose, UA Negative Negative, Normal    Ketones, UA 15 (A) Negative    Blood, UA Negative Negative    Bilirubin, UA Negative Negative    Urobilinogen, UA 0.2 0.2, 1.0, Normal    Nitrites, UA Negative Negative    Leukocyte Esterase, UA Negative Negative   Urinalysis, Microscopic    Collection Time: 04/14/24  9:07 PM   Result Value Ref Range    Bacteria, UA None Seen None Seen, Rare, Occasional /HPF    RBC, UA None Seen None Seen, 0-2, 3-5, 0-5 /HPF    WBC, UA 0-2 None Seen, 0-2, 3-5, 0-5 /HPF    Squamous Epithelial Cells, UA None Seen None Seen, Rare, Occasional, Occ /HPF   Basic metabolic panel    Collection Time: 04/15/24  4:20 AM   Result Value Ref Range    Sodium Level 130 (L) 136 - 145 mmol/L    Potassium  "Level 3.6 3.5 - 5.1 mmol/L    Chloride 98 98 - 107 mmol/L    Carbon Dioxide 22 (L) 23 - 31 mmol/L    Glucose Level 89 82 - 115 mg/dL    Blood Urea Nitrogen 5.3 (L) 8.4 - 25.7 mg/dL    Creatinine 0.73 0.73 - 1.18 mg/dL    BUN/Creatinine Ratio 7     Calcium Level Total 9.1 8.8 - 10.0 mg/dL    Anion Gap 10.0 mEq/L    eGFR >60 mls/min/1.73/m2   Ethanol    Collection Time: 04/15/24  4:20 AM   Result Value Ref Range    Ethanol Level <10.0 <=10.0 mg/dL      No results found for: "PHENYTOIN", "PHENOBARB", "VALPROATE", "CBMZ"        Psychiatric Mental Status Exam:  General Appearance: appears stated age, dressed in hospital garb, lying in bed, in no acute distress  Arousal: alert  Behavior: cooperative, polite, appropriate eye-contact, under good behavioral control  Movements and Motor Activity: no tics, no tremors, no akathisia, no dystonia, no evidence of tardive dyskinesia  Orientation: oriented to person, place, time, and situation  Speech: normal rate, rhythm, volume, tone and pitch  Mood: Euthymic  Affect: constricted  Thought Process: linear  Associations: no loosening of associations  Thought Content and Perceptions: no suicidal or homicidal ideation, no auditory or visual hallucinations, no paranoid ideation, no ideas of reference, no evidence of delusions or psychosis  Recent and Remote Memory: mild impairments noted, remote memory impaired, registers 3/3 objects, recalls 3/3 objects at 5 minutes; per interview/observation with patient  Attention and Concentration: grossly intact, able to spell WORLD forwards and backwards; per interview/observation with patient  Fund of Knowledge: grossly intact; based on history, vocabulary, fund of knowledge, syntax, grammar, and content  Insight: questionable; based on understanding of severity of illness and HPI  Judgment: adequate; based on patient's behavior and HPI          ASSESSMENT/PLAN:   Diagnoses:  SUBSTANCE-RELATED DISORDERS; Alcohol-Related Disorders; Alcohol Abuse " In a Controlled Environment , Amphetamine Related Disorders; Amphetamine Abuse , Cannabis-Related Disorders; Cannabis Abuse (F12.10), and Cocaine-Related Disorders; Cocaine Abuse  and MOOD DISORDERS; Depressive Disorder NOS (F32.9)         Past Medical History:   Diagnosis Date    Seizures           Problem lists and Management Plans:  Medication management  Hold olanzapine 5mg PO QHS  Continue trazodone 100mg PO QHS  Continue alprazolam 1mg PO BID PRN anxiety  Continue multivitamin, folic acid, and thiamine as ordered  CIWA-Ar Q6hr  Labs  Urine osmolality  Urine sodium  Serum osmolality   Will obtain these to assess for SIADH which is mechanism that antipsychotics cause hyponatremia  Psychiatrically safe for discharge. No need for PEC at this time.  Will continue to follow.      Kristopher Calvin

## 2024-04-15 NOTE — ED PROVIDER NOTES
Encounter Date: 4/14/2024       History     Chief Complaint   Patient presents with    Seizures     Pt has hx of seizures, pt states 40 min PTA, pt was sitting on a swing when he had a seizure, per pt seizure was witnessed lasting 30 sec. pt denies hitting head, Pt states he is out of his medication.      Patient reports he did not have a seizure but just felt out of it for about 30 seconds; patient's brother is present who confirms he did not have any seizure-like activity or shaking; patient denies a past history of seizure but old records indicate that he possibly has had a diagnosis in the past; he currently is not on seizure medication    The history is provided by the patient.     Review of patient's allergies indicates:  No Known Allergies  Past Medical History:   Diagnosis Date    Seizures      Past Surgical History:   Procedure Laterality Date    SPINE SURGERY       No family history on file.  Social History     Tobacco Use    Smoking status: Every Day     Current packs/day: 1.00     Types: Cigarettes    Smokeless tobacco: Never   Substance Use Topics    Alcohol use: Yes     Comment: Socially     Review of Systems   Constitutional: Negative.    HENT: Negative.     Respiratory: Negative.     Cardiovascular: Negative.    Gastrointestinal: Negative.    Genitourinary: Negative.    Musculoskeletal: Negative.    Neurological: Negative.        Physical Exam     Initial Vitals [04/14/24 1945]   BP Pulse Resp Temp SpO2   (!) 163/95 94 18 98.2 °F (36.8 °C) 97 %      MAP       --         Physical Exam    Nursing note and vitals reviewed.  Constitutional: He appears well-developed and well-nourished.   HENT:   Head: Normocephalic and atraumatic.   Mouth/Throat: Oropharynx is clear and moist.   Eyes: EOM are normal. Pupils are equal, round, and reactive to light.   Neck: Neck supple.   Normal range of motion.  Cardiovascular:  Normal rate, regular rhythm and normal heart sounds.           Pulmonary/Chest: Breath sounds  normal.   Abdominal: Abdomen is soft. There is no abdominal tenderness.   Musculoskeletal:         General: Normal range of motion.      Cervical back: Normal range of motion and neck supple.     Neurological: He is alert and oriented to person, place, and time. He has normal strength. No cranial nerve deficit. GCS score is 15. GCS eye subscore is 4. GCS verbal subscore is 5. GCS motor subscore is 6.   Skin: Skin is warm and dry.         ED Course   Procedures  Labs Reviewed   COMPREHENSIVE METABOLIC PANEL - Abnormal; Notable for the following components:       Result Value    Sodium Level 124 (*)     Chloride 87 (*)     Blood Urea Nitrogen 7.4 (*)     All other components within normal limits   DRUG SCREEN, URINE (BEAKER) - Abnormal; Notable for the following components:    Amphetamines, Urine Positive (*)     Benzodiazepine, Urine Positive (*)     Cannabinoids, Urine Positive (*)     Cocaine, Urine Positive (*)     All other components within normal limits    Narrative:     Cut off concentrations:    Amphetamines - 1000 ng/ml  Barbiturates - 200 ng/ml  Benzodiazepine - 200 ng/ml  Cannabinoids (THC) - 50 ng/ml  Cocaine - 300 ng/ml  Fentanyl - 1.0 ng/ml  MDMA - 500 ng/ml  Opiates - 300 ng/ml   Phencyclidine (PCP) - 25 ng/ml    Specimen submitted for drug analysis and tested for pH and specific gravity in order to evaluate sample integrity. Suspect tampering if specific gravity is <1.003 and/or pH is not within the range of 4.5 - 8.0  False negatives may result form substances such as bleach added to urine.  False positives may result for the presence of a substance with similar chemical structure to the drug or its metabolite.    This test provides only a PRELIMINARY analytical test result. A more specific alternate chemical method must be used in order to obtain a confirmed analytical result. Gas chromatography/mass spectrometry (GC/MS) is the preferred confirmatory method. Other chemical confirmation methods are  available. Clinical consideration and professional judgement should be applied to any drug of abuse test result, particularly when preliminary positive results are used.    Positive results will be confirmed only at the physicians request. Unconfirmed screening results are to be used only for medical purposes (treatment).        URINALYSIS, REFLEX TO URINE CULTURE - Abnormal; Notable for the following components:    Ketones, UA 15 (*)     All other components within normal limits   CBC WITH DIFFERENTIAL - Abnormal; Notable for the following components:    RBC 4.18 (*)     Hgb 13.0 (*)     Hct 36.9 (*)     MCH 31.1 (*)     All other components within normal limits   URINALYSIS, MICROSCOPIC - Normal   CBC W/ AUTO DIFFERENTIAL    Narrative:     The following orders were created for panel order CBC auto differential.  Procedure                               Abnormality         Status                     ---------                               -----------         ------                     CBC with Differential[[id number]]       Abnormal            Final result                 Please view results for these tests on the individual orders.          Imaging Results    None          Medications   acetaminophen tablet 650 mg (has no administration in time range)   calcium carbonate 200 mg calcium (500 mg) chewable tablet 1,000 mg (has no administration in time range)   ondansetron disintegrating tablet 4 mg (has no administration in time range)   benzonatate capsule 200 mg (has no administration in time range)   simethicone chewable tablet 80 mg (has no administration in time range)   pantoprazole EC tablet 40 mg (has no administration in time range)   HYDROcodone-acetaminophen 5-325 mg per tablet 1 tablet (has no administration in time range)   morphine injection 1 mg (has no administration in time range)   melatonin tablet 9 mg (has no administration in time range)   guaiFENesin 100 mg/5 ml syrup 200 mg (has no  "administration in time range)   sodium chloride 0.9% flush 2 mL (has no administration in time range)   0.9%  NaCl infusion (1,000 mLs Intravenous New Bag 4/14/24 8892)   sodium chloride 0.9% bolus 1,000 mL 1,000 mL (0 mLs Intravenous Stopped 4/14/24 2306)     Medical Decision Making  Patient reports he did not have a seizure but just felt out of it for about 30 seconds; patient's brother is present who confirms he did not have any seizure-like activity or shaking; patient denies a past history of seizure but old records indicate that he possibly has had a diagnosis in the past; he currently is not on seizure medication; patient further reports being in drug rehab about a month ago    DIFFERENTIAL DIAGNOSIS- seizure, substance abuse, electrolyte abnormality    Amount and/or Complexity of Data Reviewed  Labs: ordered. Decision-making details documented in ED Course.  Discussion of management or test interpretation with external provider(s): I reviewed patient's case with Dr. Reyes hospitalist; I reviewed HPI, past medical history, physical exam, lab findings, treatment and response to treatment; patient will be admitted to observation unit    Risk  OTC drugs.  Prescription drug management.  Risk Details: I reviewed labs in private with patient including his urine drug screen any admits that he has had a relapse; patient had low sodium and will be started on normal saline and recheck electrolytes in the morning               ED Course as of 04/15/24 0141   Sun Apr 14, 2024 2139 Amphetamines, Urine(!): Positive [DD]   2140 Benzodiazepine, Urine(!): Positive [DD]   2140 Cannabinoids, Urine(!): Positive [DD]   2140 Cocaine, Urine(!): Positive [DD]      ED Course User Index  [DD] Otis Quintero MD        Patient Vitals for the past 24 hrs:   BP Temp Temp src Pulse Resp SpO2 Height Weight   04/14/24 2342 133/77 97.5 °F (36.4 °C) -- 79 18 98 % 5' 7.99" (1.727 m) 60.9 kg (134 lb 4.2 oz)   04/14/24 2338 133/77 97.5 °F " "(36.4 °C) Oral 79 18 98 % -- --   04/14/24 2313 129/89 98.2 °F (36.8 °C) -- 81 20 98 % -- --   04/14/24 2202 (!) 164/97 -- -- 76 -- 99 % -- --   04/14/24 2201 (!) 157/93 -- -- 86 -- 98 % -- --   04/14/24 2145 (!) 171/100 -- -- 85 -- 98 % -- --   04/14/24 1945 (!) 163/95 98.2 °F (36.8 °C) Oral 94 18 97 % 5' 8" (1.727 m) 65.8 kg (145 lb)                        Clinical Impression:  Final diagnoses:  [E87.1] Hyponatremia  [F19.10] Polysubstance abuse (Primary)          ED Disposition Condition    Observation Stable                Otis Quintero MD  04/15/24 0141    "

## 2024-04-15 NOTE — PLAN OF CARE
Problem: Adult Inpatient Plan of Care  Goal: Plan of Care Review  Outcome: Ongoing, Progressing  Goal: Patient-Specific Goal (Individualized)  Outcome: Ongoing, Progressing  Flowsheets (Taken 4/15/2024 1158)  Anxieties, Fears or Concerns: none expressed  Individualized Care Needs: IV fluids, monitor lab work  Goal: Absence of Hospital-Acquired Illness or Injury  Outcome: Ongoing, Progressing  Intervention: Identify and Manage Fall Risk  Flowsheets (Taken 4/15/2024 1158)  Safety Promotion/Fall Prevention:   assistive device/personal item within reach   bed alarm set   nonskid shoes/socks when out of bed   side rails raised x 2  Goal: Optimal Comfort and Wellbeing  Outcome: Ongoing, Progressing  Goal: Readiness for Transition of Care  Outcome: Ongoing, Progressing     Problem: Electrolyte Imbalance  Goal: Electrolyte Balance  Outcome: Ongoing, Progressing  Intervention: Monitor and Manage Electrolyte Imbalance  Flowsheets (Taken 4/15/2024 1158)  Fluid/Electrolyte Management: fluids restricted     Problem: Fall Injury Risk  Goal: Absence of Fall and Fall-Related Injury  Outcome: Ongoing, Progressing  Intervention: Identify and Manage Contributors  Flowsheets (Taken 4/15/2024 1158)  Self-Care Promotion:   independence encouraged   safe use of adaptive equipment encouraged   BADL personal objects within reach  Medication Review/Management: medications reviewed  Intervention: Promote Injury-Free Environment  Flowsheets (Taken 4/15/2024 1158)  Safety Promotion/Fall Prevention:   assistive device/personal item within reach   bed alarm set   nonskid shoes/socks when out of bed   side rails raised x 2     Problem: Infection  Goal: Absence of Infection Signs and Symptoms  Outcome: Ongoing, Progressing  Intervention: Prevent or Manage Infection  Flowsheets (Taken 4/15/2024 1158)  Infection Management: aseptic technique maintained  Isolation Precautions: protective

## 2024-04-15 NOTE — NURSING
Nurses Note -- 4 Eyes      4/15/2024   12:00 AM      Skin assessed during: Admit      [x] No Altered Skin Integrity Present    []Prevention Measures Documented      [] Yes- Altered Skin Integrity Present or Discovered   [] LDA Added if Not in Epic (Describe Wound)   [] New Altered Skin Integrity was Present on Admit and Documented in LDA   [] Wound Image Taken    Wound Care Consulted? No    Attending Nurse:  Alberta Meraz RN/Staff Member:   Esther blevins / NILS moreno rn

## 2024-04-16 VITALS
WEIGHT: 134.25 LBS | OXYGEN SATURATION: 96 % | DIASTOLIC BLOOD PRESSURE: 78 MMHG | SYSTOLIC BLOOD PRESSURE: 123 MMHG | HEART RATE: 83 BPM | HEIGHT: 68 IN | RESPIRATION RATE: 18 BRPM | BODY MASS INDEX: 20.34 KG/M2 | TEMPERATURE: 98 F

## 2024-04-16 PROBLEM — F19.10 POLYSUBSTANCE ABUSE: Status: ACTIVE | Noted: 2024-04-16

## 2024-04-16 LAB
ANION GAP SERPL CALC-SCNC: 8 MEQ/L
BUN SERPL-MCNC: 7.6 MG/DL (ref 8.4–25.7)
CALCIUM SERPL-MCNC: 9.2 MG/DL (ref 8.8–10)
CHLORIDE SERPL-SCNC: 103 MMOL/L (ref 98–107)
CO2 SERPL-SCNC: 23 MMOL/L (ref 23–31)
CREAT SERPL-MCNC: 0.78 MG/DL (ref 0.73–1.18)
CREAT/UREA NIT SERPL: 10
GFR SERPLBLD CREATININE-BSD FMLA CKD-EPI: >60 MLS/MIN/1.73/M2
GLUCOSE SERPL-MCNC: 98 MG/DL (ref 82–115)
OSMOLALITY SERPL: 274 MOSM/KG (ref 280–300)
OSMOLALITY UR: 490 MOSM/KG (ref 300–1300)
POTASSIUM SERPL-SCNC: 3.6 MMOL/L (ref 3.5–5.1)
SODIUM SERPL-SCNC: 134 MMOL/L (ref 136–145)
SODIUM UR-SCNC: 130 MMOL/L

## 2024-04-16 PROCEDURE — 83930 ASSAY OF BLOOD OSMOLALITY: CPT

## 2024-04-16 PROCEDURE — 25000003 PHARM REV CODE 250: Performed by: STUDENT IN AN ORGANIZED HEALTH CARE EDUCATION/TRAINING PROGRAM

## 2024-04-16 PROCEDURE — 25000003 PHARM REV CODE 250: Performed by: SPECIALIST

## 2024-04-16 PROCEDURE — G0378 HOSPITAL OBSERVATION PER HR: HCPCS

## 2024-04-16 PROCEDURE — 94760 N-INVAS EAR/PLS OXIMETRY 1: CPT

## 2024-04-16 PROCEDURE — 80048 BASIC METABOLIC PNL TOTAL CA: CPT | Performed by: STUDENT IN AN ORGANIZED HEALTH CARE EDUCATION/TRAINING PROGRAM

## 2024-04-16 PROCEDURE — 99900035 HC TECH TIME PER 15 MIN (STAT)

## 2024-04-16 RX ORDER — HYDROXYZINE PAMOATE 50 MG/1
100 CAPSULE ORAL NIGHTLY
Qty: 60 CAPSULE | Refills: 0 | Status: SHIPPED | OUTPATIENT
Start: 2024-04-16 | End: 2024-05-16

## 2024-04-16 RX ORDER — TRAZODONE HYDROCHLORIDE 100 MG/1
100 TABLET ORAL NIGHTLY
Qty: 30 TABLET | Refills: 0 | Status: SHIPPED | OUTPATIENT
Start: 2024-04-16 | End: 2024-05-16

## 2024-04-16 RX ORDER — SODIUM CHLORIDE 1 G/1
1000 TABLET ORAL 2 TIMES DAILY
Qty: 60 TABLET | Refills: 0 | Status: SHIPPED | OUTPATIENT
Start: 2024-04-16 | End: 2024-05-16

## 2024-04-16 RX ORDER — PANTOPRAZOLE SODIUM 40 MG/1
40 TABLET, DELAYED RELEASE ORAL DAILY
Qty: 30 TABLET | Refills: 0 | Status: SHIPPED | OUTPATIENT
Start: 2024-04-16 | End: 2024-05-16

## 2024-04-16 RX ORDER — FOLIC ACID 1 MG/1
1 TABLET ORAL DAILY
Qty: 30 TABLET | Refills: 0 | Status: SHIPPED | OUTPATIENT
Start: 2024-04-16 | End: 2024-05-16

## 2024-04-16 RX ORDER — NIFEDIPINE 30 MG/1
30 TABLET, FILM COATED, EXTENDED RELEASE ORAL DAILY
Qty: 30 TABLET | Refills: 0 | Status: SHIPPED | OUTPATIENT
Start: 2024-04-16 | End: 2024-05-16

## 2024-04-16 RX ORDER — LANOLIN ALCOHOL/MO/W.PET/CERES
100 CREAM (GRAM) TOPICAL DAILY
Qty: 30 TABLET | Refills: 0 | Status: SHIPPED | OUTPATIENT
Start: 2024-04-17 | End: 2024-05-17

## 2024-04-16 RX ORDER — FAMOTIDINE 20 MG/1
20 TABLET, FILM COATED ORAL 2 TIMES DAILY
Qty: 60 TABLET | Refills: 0 | Status: SHIPPED | OUTPATIENT
Start: 2024-04-16 | End: 2024-05-16

## 2024-04-16 RX ADMIN — NIFEDIPINE 30 MG: 30 TABLET, FILM COATED, EXTENDED RELEASE ORAL at 09:04

## 2024-04-16 RX ADMIN — FAMOTIDINE 20 MG: 20 TABLET, FILM COATED ORAL at 09:04

## 2024-04-16 RX ADMIN — FOLIC ACID 1 MG: 1 TABLET ORAL at 09:04

## 2024-04-16 RX ADMIN — SODIUM CHLORIDE 1000 MG: 1 TABLET ORAL at 09:04

## 2024-04-16 RX ADMIN — THIAMINE HCL TAB 100 MG 100 MG: 100 TAB at 09:04

## 2024-04-16 RX ADMIN — PANTOPRAZOLE SODIUM 40 MG: 40 TABLET, DELAYED RELEASE ORAL at 09:04

## 2024-04-16 RX ADMIN — MULTIPLE VITAMINS W/ MINERALS TAB 1 TABLET: TAB at 09:04

## 2024-04-16 NOTE — NURSING
Notified by REMY Sanchez LPN that patient left without receiving AVS,left unit without telling nurse. Call placed to  Josef. Brother states that patient is currently at [facility], he had a seizure on the way home.

## 2024-04-16 NOTE — DISCHARGE SUMMARY
"[facility]  HOSPITAL MEDICINE - DISCHARGE SUMMARY    Patient Name: Coral Emerson Sr.  MRN: [id number]  Admission Date: 4/14/2024  Discharge Date: 04/16/2024  Hospital Length of Stay: 0 days  Discharge Provider: Thairy G Reyes, MD  Primary Care Provider: Raquel Duke MD    HOSPITAL COURSE   63-year-old male with a past medical history chronic hyponatremia, essential hypertension, mood disorder, former ETOH dependence with history of withdrawal seizure, illicit drug use who presents with complaint of an episode of "staring off into space" with associated dizziness.  Workup in the emergency room revealed hyponatremia and he was admitted for further management of electrolyte derangements. Patient reports last illicit drug use was approximately 1 week ago and he has decreased ETOH use to once a week one beer.     Today his sodium has improved to 134 and he has not had any further episodes of staring off.  He denies any complaints today.  Given that part of his medication noncompliance secondary to not having his medications at home I have refilled them.  His losartan was discontinued given that it can contribute to hyponatremia and his olanzapine has been held as well.  He can continue to reassess these medications with his PCP but for now his blood pressure is well controlled with his nifedipine alone.  He was stable for discharge to home today.  PHYSICAL EXAM     Most Recent Vital Signs:  Temp: 97.9 °F (36.6 °C) (04/16/24 0704)  Pulse: 83 (04/16/24 0851)  Resp: 18 (04/16/24 0851)  BP: 123/78 (04/16/24 0704)  SpO2: 96 % (04/16/24 0851)   GENERAL: In no acute distress, afebrile  HEENT:  PERRLA  CHEST: Clear to auscultation bilaterally  HEART: S1, S2, no appreciable murmur  ABDOMEN: Soft, nontender, BS +  MSK: Warm, no lower extremity edema, no clubbing or cyanosis  NEUROLOGIC: Alert and oriented x4, moving all extremities with good   DISCHARGE DIAGNOSIS   Acute on chronic " hyponatremia   ETOH dependence, illicit drug use   Medication noncompliance  -Had an admission as recent as August 2023 where he was evaluated by Nephrology for hyponatremia 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  -fluid restrict to 1500  -Recommend ETOH and polysubstance use cessation     Metabolic encephalopathy   History of withdrawal seizure  -patient had a seizure suspected to be secondary to hyponatremia and ETOH withdrawal, on no antiepileptics  -do not suspect episode that brought him to the ED was seizure related but rather metabolic encephalopathy from hyponatremia and polysubstance use     Mood disorder   -Patient  was evaluated by tele psychiatry on 415, recommended holding olanzapine and continuing the rest of his medications     Essential hypertension  -Patient on nifedipine and losartan at home   -Discontinue losartan given persistent hyponatremia, well controlled on nifedipine only at this time        Patient's screen for food insecurity, housing instability, transportation needs, utility difficulties, and interpersonal safety. No needs identified  _____________________________________________________________________________      DISCHARGE MED REC     Current Discharge Medication List        START taking these medications    Details   multivitamin Tab Take 1 tablet by mouth once daily.  Qty: 30 tablet, Refills: 0      sodium chloride 1,000 mg TbSO oral tablet Take 1 tablet (1,000 mg total) by mouth 2 (two) times daily.  Qty: 60 tablet, Refills: 0      thiamine 100 MG tablet Take 1 tablet (100 mg total) by mouth once daily.  Qty: 30 tablet, Refills: 0           CONTINUE these medications which have CHANGED    Details   famotidine (PEPCID) 20 MG tablet Take 1 tablet (20 mg total) by mouth 2 (two) times daily.  Qty: 60 tablet, Refills: 0       folic acid (FOLVITE) 1 MG tablet Take 1 tablet (1 mg total) by mouth once daily.  Qty: 30 tablet, Refills: 0      hydrOXYzine pamoate (VISTARIL) 50 MG Cap Take 2 capsules (100 mg total) by mouth nightly.  Qty: 60 capsule, Refills: 0      NIFEdipine (ADALAT CC) 30 MG TbSR Take 1 tablet (30 mg total) by mouth once daily.  Qty: 30 tablet, Refills: 0    Comments: .      pantoprazole (PROTONIX) 40 MG tablet Take 1 tablet (40 mg total) by mouth once daily.  Before breakfast  Qty: 30 tablet, Refills: 0      traZODone (DESYREL) 100 MG tablet Take 1 tablet (100 mg total) by mouth every evening.  Qty: 30 tablet, Refills: 0           CONTINUE these medications which have NOT CHANGED    Details   ALPRAZolam (XANAX) 1 MG tablet Take one tablet by mouth twice daily  Qty: 60 tablet, Refills: 5           STOP taking these medications       losartan (COZAAR) 100 MG tablet Comments:   Reason for Stopping:         OLANZapine (ZYPREXA) 5 MG tablet Comments:   Reason for Stopping:                  CONSULTS     Consults (From admission, onward)          Status Ordering Provider     Inpatient consult to Telemedicine - Psychiatry  Once        Provider:  [facility] Completed REYES, THAIRY G              FOLLOW UP      Follow-up Information       Raquel Duke MD Follow up.    Specialty: Family Medicine  Contact information:  [address] [place] [address] [id number] [phone]                                 DISCHARGE INSTRUCTIONS     Explained in detail to the patient about the discharge plan, medications, and follow-up visits. Pt understands and agrees with the treatment plan.  Discharged Condition: stable  Diet as tolerated  Activities as tolerated  Discharge to: Home or Self Care    TIME SPENT ON DISCHARGE   35 minutes        Thairy G Reyes, DO  Internal Medicine  Department of Hospital Medicine [facility]      This document was created using electronic dictation services.   Please excuse any errors that may have been made.  Contact me if any questions regarding documentation to clarify verbiage.

## 2024-04-17 NOTE — PLAN OF CARE
[facility]  Discharge Final Note    Primary Care Provider: Raquel Duke MD    Expected Discharge Date: 4/16/2024    Final Discharge Note (most recent)       Final Note - 04/16/24 1911          Final Note    Assessment Type Final Discharge Note     Anticipated Discharge Disposition Home or Self Care     What phone number can be called within the next 1-3 days to see how you are doing after discharge? [phone]     Hospital Resources/Appts/Education Provided Provided patient/caregiver with written discharge plan information        Post-Acute Status    Discharge Delays None known at this time                     Important Message from Medicare             Contact Info       Raquel Duke MD   Specialty: Family Medicine   Relationship: PCP - General    [place] [id number]   Phone: [phone]       Next Steps: Go on 4/24/2024    Instructions: @ 1:15 p.m.     Spoke to Rosy

## 2025-05-05 ENCOUNTER — HOSPITAL ENCOUNTER (EMERGENCY)
Facility: HOSPITAL | Age: 65
Discharge: HOME | End: 2025-05-05
Attending: FAMILY MEDICINE
Payer: COMMERCIAL

## 2025-05-05 VITALS
WEIGHT: 128 LBS | SYSTOLIC BLOOD PRESSURE: 172 MMHG | RESPIRATION RATE: 18 BRPM | TEMPERATURE: 98 F | BODY MASS INDEX: 20.09 KG/M2 | DIASTOLIC BLOOD PRESSURE: 92 MMHG | OXYGEN SATURATION: 98 % | HEIGHT: 67 IN | HEART RATE: 77 BPM

## 2025-05-05 DIAGNOSIS — G89.29 CHRONIC RIGHT HIP PAIN: ICD-10-CM

## 2025-05-05 DIAGNOSIS — H66.91 RIGHT OTITIS MEDIA, UNSPECIFIED OTITIS MEDIA TYPE: ICD-10-CM

## 2025-05-05 DIAGNOSIS — M25.551 CHRONIC RIGHT HIP PAIN: ICD-10-CM

## 2025-05-05 DIAGNOSIS — T14.8XXA PUNCTURE WOUND: ICD-10-CM

## 2025-05-05 DIAGNOSIS — H66.014 RECURRENT ACUTE SUPPURATIVE OTITIS MEDIA OF RIGHT EAR WITH SPONTANEOUS RUPTURE OF TYMPANIC MEMBRANE: ICD-10-CM

## 2025-05-05 DIAGNOSIS — H60.311 CHRONIC DIFFUSE OTITIS EXTERNA OF RIGHT EAR: Primary | ICD-10-CM

## 2025-05-05 DIAGNOSIS — H66.11 CHRONIC TUBOTYMPANIC SUPPURATIVE OTITIS MEDIA OF RIGHT EAR: ICD-10-CM

## 2025-05-05 DIAGNOSIS — R52 PAIN: ICD-10-CM

## 2025-05-05 PROCEDURE — 90715 TDAP VACCINE 7 YRS/> IM: CPT | Performed by: FAMILY MEDICINE

## 2025-05-05 PROCEDURE — 99284 EMERGENCY DEPT VISIT MOD MDM: CPT | Mod: 25

## 2025-05-05 PROCEDURE — 63600175 PHARM REV CODE 636 W HCPCS: Performed by: FAMILY MEDICINE

## 2025-05-05 PROCEDURE — 96372 THER/PROPH/DIAG INJ SC/IM: CPT | Performed by: FAMILY MEDICINE

## 2025-05-05 PROCEDURE — 90471 IMMUNIZATION ADMIN: CPT | Performed by: FAMILY MEDICINE

## 2025-05-05 RX ORDER — AMOXICILLIN AND CLAVULANATE POTASSIUM 875; 125 MG/1; MG/1
1 TABLET, FILM COATED ORAL 2 TIMES DAILY
Qty: 14 TABLET | Refills: 0 | Status: SHIPPED | OUTPATIENT
Start: 2025-05-05

## 2025-05-05 RX ORDER — DEXAMETHASONE SODIUM PHOSPHATE 4 MG/ML
8 INJECTION, SOLUTION INTRA-ARTICULAR; INTRALESIONAL; INTRAMUSCULAR; INTRAVENOUS; SOFT TISSUE
Status: COMPLETED | OUTPATIENT
Start: 2025-05-05 | End: 2025-05-05

## 2025-05-05 RX ORDER — OFLOXACIN 3 MG/ML
3 SOLUTION AURICULAR (OTIC) 2 TIMES DAILY
Qty: 5 ML | Refills: 0 | Status: SHIPPED | OUTPATIENT
Start: 2025-05-05 | End: 2025-05-12

## 2025-05-05 RX ORDER — CEFTRIAXONE 1 G/1
1 INJECTION, POWDER, FOR SOLUTION INTRAMUSCULAR; INTRAVENOUS
Status: COMPLETED | OUTPATIENT
Start: 2025-05-05 | End: 2025-05-05

## 2025-05-05 RX ORDER — PREDNISONE 20 MG/1
20 TABLET ORAL DAILY
Qty: 5 TABLET | Refills: 0 | Status: SHIPPED | OUTPATIENT
Start: 2025-05-05 | End: 2025-05-10

## 2025-05-05 RX ADMIN — DEXAMETHASONE SODIUM PHOSPHATE 8 MG: 4 INJECTION, SOLUTION INTRA-ARTICULAR; INTRALESIONAL; INTRAMUSCULAR; INTRAVENOUS; SOFT TISSUE at 09:05

## 2025-05-05 RX ADMIN — CEFTRIAXONE SODIUM 1 G: 1 INJECTION, POWDER, FOR SOLUTION INTRAMUSCULAR; INTRAVENOUS at 09:05

## 2025-05-05 RX ADMIN — CLOSTRIDIUM TETANI TOXOID ANTIGEN (FORMALDEHYDE INACTIVATED), CORYNEBACTERIUM DIPHTHERIAE TOXOID ANTIGEN (FORMALDEHYDE INACTIVATED), BORDETELLA PERTUSSIS TOXOID ANTIGEN (GLUTARALDEHYDE INACTIVATED), BORDETELLA PERTUSSIS FILAMENTOUS HEMAGGLUTININ ANTIGEN (FORMALDEHYDE INACTIVATED), BORDETELLA PERTUSSIS PERTACTIN ANTIGEN, AND BORDETELLA PERTUSSIS FIMBRIAE 2/3 ANTIGEN 0.5 ML: 5; 2; 2.5; 5; 3; 5 INJECTION, SUSPENSION INTRAMUSCULAR at 09:05

## 2025-05-05 NOTE — ED PROVIDER NOTES
Encounter Date: 5/5/2025       History     Chief Complaint   Patient presents with    Otalgia     C/o R ear pain x one month; was treated with antibiotics one month ago with no improvement.      Abrasion     Pt has an abrasion to the L foot after dropping a knife on the foot yesterday.      64-year-old presents with multiple complaints has been having any ear infection in the right ear for a month he is going to go into rounds of antibiotics with the PCP still has a little otitis media and externa told the patient we will give him a shot of Rocephin and give him some Floxin drops to take home recommend he follows up with the ENT he understands we will follow up he said 2nd probably dropped a knife last night wound to the left foot has full range of motion of his toes and foot does not appear to be any tendon damage does have a little redness maybe low early cellulitis from the puncture wound he is not up-to-date on tetanus we will give him a tetanus shot put him on some antibiotics could x-ray make sure his no foreign bodies in his foot 3rd complaining of some chronic hip pain over a year in his right hip says it just slowly getting worse over the past year flu-like an x-ray of it and we will go ahead and get a hip x-ray give him a shot of cortisone        Review of patient's allergies indicates:  No Known Allergies  Past Medical History:   Diagnosis Date    Polysubstance abuse     Seizures      Past Surgical History:   Procedure Laterality Date    SPINE SURGERY       No family history on file.  Social History[1]  Review of Systems   HENT:  Positive for ear pain.    Musculoskeletal:         Right hip pain   Skin:  Positive for wound.   All other systems reviewed and are negative.      Physical Exam     Initial Vitals [05/05/25 0839]   BP Pulse Resp Temp SpO2   (!) 172/92 77 18 97.7 °F (36.5 °C) 98 %      MAP       --         Physical Exam    Nursing note and vitals reviewed.  Constitutional: He appears  well-developed and well-nourished. He is active.   HENT:   Head: Normocephalic and atraumatic.   Right ear has a otitis media with a swollen canal some otitis externa with   Eyes: Conjunctivae, EOM and lids are normal. Pupils are equal, round, and reactive to light.   Neck: Trachea normal and phonation normal. Neck supple. No thyroid mass present.   Normal range of motion.  Cardiovascular:  Normal rate, regular rhythm, normal heart sounds and normal pulses.           Abdominal: Abdomen is soft. Bowel sounds are normal.   Musculoskeletal:         General: Tenderness and edema present. Normal range of motion.      Cervical back: Normal range of motion and neck supple.     Neurological: He is alert and oriented to person, place, and time. He has normal strength and normal reflexes.   Skin: Skin is warm and intact.   Puncture wound on left foot with some erythema and tenderness   Psychiatric: He has a normal mood and affect. His speech is normal and behavior is normal. Judgment and thought content normal. Cognition and memory are normal.         ED Course   Procedures  Labs Reviewed - No data to display       Imaging Results              X-Ray Foot Complete Left (Preliminary result)  Result time 05/05/25 10:52:38      Wet Read by Brandon Hunt MD (05/05/25 10:52:38, [facility], Emergency Medicine)    No signs of fracture or foreign bodies                                     X-Ray Hip 2 or 3 views Right with Pelvis when performed (Preliminary result)  Result time 05/05/25 10:52:07      Wet Read by Brandon Hunt MD (05/05/25 10:52:07, [facility])    No acute injury                                     Medications   cefTRIAXone injection 1 g (1 g Intramuscular Given 5/5/25 0951)   Tdap vaccine injection 0.5 mL (0.5 mLs Intramuscular Given 5/5/25 0950)   dexAMETHasone injection 8 mg (8 mg Intramuscular Given 5/5/25 0950)     Medical Decision  Making  64-year-old presents with multiple complaints has been having any ear infection in the right ear for a month he is going to go into rounds of antibiotics with the PCP still has a little otitis media and externa told the patient we will give him a shot of Rocephin and give him some Floxin drops to take home recommend he follows up with the ENT he understands we will follow up he said 2nd probably dropped a knife last night wound to the left foot has full range of motion of his toes and foot does not appear to be any tendon damage does have a little redness maybe low early cellulitis from the puncture wound he is not up-to-date on tetanus we will give him a tetanus shot put him on some antibiotics could x-ray make sure his no foreign bodies in his foot 3rd complaining of some chronic hip pain over a year in his right hip says it just slowly getting worse over the past year flu-like an x-ray of it and we will go ahead and get a hip x-ray give him a shot of cortisone          Amount and/or Complexity of Data Reviewed  Radiology: ordered and independent interpretation performed.    Risk  Prescription drug management.  Risk Details: Differential diagnosis puncture wound skin infection chronic arthritis of the hip versus acute bursitis otitis media versus otitis externa                                      Clinical Impression:  Final diagnoses:  [R52] Pain  [T14.8XXA] Puncture wound  [H60.311] Chronic diffuse otitis externa of right ear (Primary)  [H66.11] Chronic tubotympanic suppurative otitis media of right ear  [M25.551, G89.29] Chronic right hip pain  [H66.91] Right otitis media, unspecified otitis media type  [H66.014] Recurrent acute suppurative otitis media of right ear with spontaneous rupture of tympanic membrane          ED Disposition Condition    Discharge Stable          ED Prescriptions       Medication Sig Dispense Start Date End Date Auth. Provider    amoxicillin-clavulanate 875-125mg (AUGMENTIN)  875-125 mg per tablet Take 1 tablet by mouth 2 (two) times daily. 14 tablet 5/5/2025 -- Brandon Hunt MD    ofloxacin (FLOXIN) 0.3 % otic solution Place 3 drops into the right ear 2 (two) times daily. for 7 days 5 mL 5/5/2025 5/12/2025 Brandon Hunt MD    predniSONE (DELTASONE) 20 MG tablet Take 1 tablet (20 mg total) by mouth once daily. for 5 days 5 tablet 5/5/2025 5/10/2025 Brandon Hunt MD          Follow-up Information       Follow up With Specialties Details Why Contact Info    Raquel Duke MD Family Medicine In 3 days  [address] [place] [id number]  [phone]                   [1]   Social History  Tobacco Use    Smoking status: Every Day     Current packs/day: 1.00     Types: Cigarettes    Smokeless tobacco: Never   Substance Use Topics    Alcohol use: Yes     Comment: Socially        Brandon Hunt MD  05/05/25 1862